# Patient Record
Sex: FEMALE | Race: BLACK OR AFRICAN AMERICAN | NOT HISPANIC OR LATINO | Employment: FULL TIME | URBAN - METROPOLITAN AREA
[De-identification: names, ages, dates, MRNs, and addresses within clinical notes are randomized per-mention and may not be internally consistent; named-entity substitution may affect disease eponyms.]

---

## 2021-05-03 ENCOUNTER — EH NON-PROVIDER (OUTPATIENT)
Dept: OCCUPATIONAL MEDICINE | Facility: CLINIC | Age: 41
End: 2021-05-03

## 2021-05-03 ENCOUNTER — EMPLOYEE HEALTH (OUTPATIENT)
Dept: OCCUPATIONAL MEDICINE | Facility: CLINIC | Age: 41
End: 2021-05-03

## 2021-05-03 ENCOUNTER — HOSPITAL ENCOUNTER (OUTPATIENT)
Facility: MEDICAL CENTER | Age: 41
End: 2021-05-03
Attending: NURSE PRACTITIONER
Payer: COMMERCIAL

## 2021-05-03 VITALS
DIASTOLIC BLOOD PRESSURE: 84 MMHG | TEMPERATURE: 98.2 F | WEIGHT: 186 LBS | OXYGEN SATURATION: 97 % | RESPIRATION RATE: 16 BRPM | HEART RATE: 57 BPM | HEIGHT: 64 IN | BODY MASS INDEX: 31.76 KG/M2 | SYSTOLIC BLOOD PRESSURE: 118 MMHG

## 2021-05-03 DIAGNOSIS — Z02.1 PRE-EMPLOYMENT DRUG SCREENING: Primary | ICD-10-CM

## 2021-05-03 DIAGNOSIS — Z02.1 PRE-EMPLOYMENT HEALTH SCREENING EXAMINATION: ICD-10-CM

## 2021-05-03 DIAGNOSIS — Z02.1 PRE-EMPLOYMENT DRUG SCREENING: ICD-10-CM

## 2021-05-03 LAB
AMP AMPHETAMINE: NORMAL
BAR BARBITURATES: NORMAL
BZO BENZODIAZEPINES: NORMAL
COC COCAINE: NORMAL
INT CON NEG: NORMAL
INT CON POS: NORMAL
MDMA ECSTASY: NORMAL
MET METHAMPHETAMINES: NORMAL
MEV IGG SER IA-ACNC: 1.02
MTD METHADONE: NORMAL
MUV IGG SER IA-ACNC: 0.92
OPI OPIATES: NORMAL
OXY OXYCODONE: NORMAL
PCP PHENCYCLIDINE: NORMAL
POC URINE DRUG SCREEN OCDRS: NEGATIVE
RUBV AB SER QL: 52.2 IU/ML
THC: NORMAL
VZV IGG SER IA-ACNC: 2.02

## 2021-05-03 PROCEDURE — 86787 VARICELLA-ZOSTER ANTIBODY: CPT | Performed by: NURSE PRACTITIONER

## 2021-05-03 PROCEDURE — 86765 RUBEOLA ANTIBODY: CPT | Performed by: NURSE PRACTITIONER

## 2021-05-03 PROCEDURE — 86480 TB TEST CELL IMMUN MEASURE: CPT | Performed by: NURSE PRACTITIONER

## 2021-05-03 PROCEDURE — 86762 RUBELLA ANTIBODY: CPT | Performed by: NURSE PRACTITIONER

## 2021-05-03 PROCEDURE — 90715 TDAP VACCINE 7 YRS/> IM: CPT | Performed by: NURSE PRACTITIONER

## 2021-05-03 PROCEDURE — 80305 DRUG TEST PRSMV DIR OPT OBS: CPT | Performed by: NURSE PRACTITIONER

## 2021-05-03 PROCEDURE — 8915 PR COMPREHENSIVE PHYSICAL: Performed by: NURSE PRACTITIONER

## 2021-05-03 PROCEDURE — 86735 MUMPS ANTIBODY: CPT | Performed by: NURSE PRACTITIONER

## 2021-05-03 RX ORDER — LEVETIRACETAM 250 MG/1
1000 TABLET ORAL 2 TIMES DAILY
COMMUNITY
End: 2021-07-01

## 2021-05-03 RX ORDER — ZOLPIDEM TARTRATE 5 MG/1
5 TABLET ORAL NIGHTLY PRN
COMMUNITY
End: 2021-07-01

## 2021-05-04 LAB
GAMMA INTERFERON BACKGROUND BLD IA-ACNC: 0.08 IU/ML
M TB IFN-G BLD-IMP: NEGATIVE
M TB IFN-G CD4+ BCKGRND COR BLD-ACNC: 0 IU/ML
MITOGEN IGNF BCKGRD COR BLD-ACNC: >10 IU/ML
QFT TB2 - NIL TBQ2: 0 IU/ML

## 2021-05-10 ENCOUNTER — EH NON-PROVIDER (OUTPATIENT)
Dept: OCCUPATIONAL MEDICINE | Facility: CLINIC | Age: 41
End: 2021-05-10

## 2021-05-10 DIAGNOSIS — Z71.85 IMMUNIZATION COUNSELING: ICD-10-CM

## 2021-06-22 ENCOUNTER — TELEPHONE (OUTPATIENT)
Dept: HEALTH INFORMATION MANAGEMENT | Facility: OTHER | Age: 41
End: 2021-06-22

## 2021-07-01 ENCOUNTER — OFFICE VISIT (OUTPATIENT)
Dept: MEDICAL GROUP | Facility: PHYSICIAN GROUP | Age: 41
End: 2021-07-01
Payer: COMMERCIAL

## 2021-07-01 VITALS
BODY MASS INDEX: 31.41 KG/M2 | DIASTOLIC BLOOD PRESSURE: 78 MMHG | HEIGHT: 64 IN | SYSTOLIC BLOOD PRESSURE: 126 MMHG | TEMPERATURE: 97.8 F | RESPIRATION RATE: 16 BRPM | OXYGEN SATURATION: 100 % | HEART RATE: 58 BPM | WEIGHT: 184 LBS

## 2021-07-01 DIAGNOSIS — Z13.0 SCREENING FOR DEFICIENCY ANEMIA: ICD-10-CM

## 2021-07-01 DIAGNOSIS — R73.01 ELEVATED FASTING GLUCOSE: ICD-10-CM

## 2021-07-01 DIAGNOSIS — F51.01 PRIMARY INSOMNIA: ICD-10-CM

## 2021-07-01 DIAGNOSIS — G40.109 PARTIAL EPILEPSY (HCC): ICD-10-CM

## 2021-07-01 DIAGNOSIS — Z13.29 THYROID DISORDER SCREEN: ICD-10-CM

## 2021-07-01 DIAGNOSIS — Z13.6 SCREENING FOR CARDIOVASCULAR CONDITION: ICD-10-CM

## 2021-07-01 DIAGNOSIS — Z12.31 ENCOUNTER FOR SCREENING MAMMOGRAM FOR MALIGNANT NEOPLASM OF BREAST: ICD-10-CM

## 2021-07-01 DIAGNOSIS — R10.2 PELVIC PAIN: ICD-10-CM

## 2021-07-01 PROBLEM — D17.9 LIPOMA: Status: RESOLVED | Noted: 2019-11-27 | Resolved: 2021-07-01

## 2021-07-01 PROBLEM — M54.50 LOW BACK PAIN: Status: ACTIVE | Noted: 2017-02-13

## 2021-07-01 PROBLEM — D17.9 LIPOMA: Status: ACTIVE | Noted: 2019-11-27

## 2021-07-01 PROBLEM — M54.50 LOW BACK PAIN: Status: RESOLVED | Noted: 2017-02-13 | Resolved: 2021-07-01

## 2021-07-01 PROBLEM — U07.1 DISEASE DUE TO SEVERE ACUTE RESPIRATORY SYNDROME CORONAVIRUS 2 (SARS-COV-2): Status: RESOLVED | Noted: 2020-11-29 | Resolved: 2021-07-01

## 2021-07-01 PROBLEM — U07.1 DISEASE DUE TO SEVERE ACUTE RESPIRATORY SYNDROME CORONAVIRUS 2 (SARS-COV-2): Status: ACTIVE | Noted: 2020-11-29

## 2021-07-01 PROCEDURE — 99204 OFFICE O/P NEW MOD 45 MIN: CPT | Performed by: INTERNAL MEDICINE

## 2021-07-01 RX ORDER — ALPRAZOLAM 0.25 MG/1
0.25 TABLET ORAL
COMMUNITY
End: 2021-07-01

## 2021-07-01 RX ORDER — TEMAZEPAM 30 MG/1
30 CAPSULE ORAL
COMMUNITY
End: 2021-07-01

## 2021-07-01 RX ORDER — IBUPROFEN 600 MG/1
600 TABLET ORAL
COMMUNITY
Start: 2020-12-31 | End: 2021-08-17

## 2021-07-01 RX ORDER — ZOLPIDEM TARTRATE 5 MG/1
TABLET ORAL
COMMUNITY
Start: 2021-04-27 | End: 2021-07-01

## 2021-07-01 RX ORDER — ZOLPIDEM TARTRATE 5 MG/1
5 TABLET ORAL NIGHTLY PRN
Qty: 30 TABLET | Refills: 2 | Status: SHIPPED | OUTPATIENT
Start: 2021-07-01 | End: 2021-07-31

## 2021-07-01 RX ORDER — LEVETIRACETAM 250 MG/1
TABLET ORAL
Qty: 90 TABLET | Refills: 2 | Status: SHIPPED | OUTPATIENT
Start: 2021-07-01 | End: 2021-09-29 | Stop reason: SDUPTHER

## 2021-07-01 ASSESSMENT — PATIENT HEALTH QUESTIONNAIRE - PHQ9: CLINICAL INTERPRETATION OF PHQ2 SCORE: 0

## 2021-08-17 ENCOUNTER — TELEMEDICINE (OUTPATIENT)
Dept: MEDICAL GROUP | Facility: PHYSICIAN GROUP | Age: 41
End: 2021-08-17
Payer: COMMERCIAL

## 2021-08-17 VITALS — RESPIRATION RATE: 16 BRPM | BODY MASS INDEX: 30.73 KG/M2 | WEIGHT: 180 LBS | HEIGHT: 64 IN

## 2021-08-17 DIAGNOSIS — S81.811A LACERATION OF RIGHT LOWER EXTREMITY, INITIAL ENCOUNTER: ICD-10-CM

## 2021-08-17 PROCEDURE — 99212 OFFICE O/P EST SF 10 MIN: CPT | Mod: 95 | Performed by: INTERNAL MEDICINE

## 2021-08-17 RX ORDER — ZOLPIDEM TARTRATE 5 MG/1
5 TABLET ORAL NIGHTLY PRN
COMMUNITY
End: 2021-09-24 | Stop reason: SDUPTHER

## 2021-08-17 RX ORDER — IBUPROFEN 600 MG/1
1 TABLET ORAL EVERY 6 HOURS PRN
COMMUNITY
Start: 2020-12-31 | End: 2021-09-24

## 2021-08-17 NOTE — PROGRESS NOTES
Virtual Visit: Established Patient   This visit was conducted via Zoom using secure and encrypted videoconferencing technology. The patient was in a private location in the state of [place].    The patient's identity was confirmed and verbal consent was obtained for this virtual visit.    Subjective:   CC: Lacson Harris is a 40 y.o. female presenting for evaluation and management of:    The patient states she was at [employer] 2 days ago and cut herself on a loose screw that was on the back of a couch patient.  The patient reports a cut on the right lateral thigh.  She cleaned the wound and has been applying Neosporin.  She states that it has been many years since she has had a tetanus vaccine.    ROS   Denies any recent fevers or chills. No nausea or vomiting. No chest pains or shortness of breath.     Allergies   Allergen Reactions   • [place] Oil        Current medicines (including changes today)  Current Outpatient Medications   Medication Sig Dispense Refill   • zolpidem (AMBIEN) 5 MG Tab Take 5 mg by mouth at bedtime as needed for Sleep.     • ibuprofen (MOTRIN) 600 MG Tab Take 1 Tablet by mouth every 6 hours as needed.     • levETIRAcetam (KEPPRA) 250 MG tablet Take 3 tablets daily x 1 month, then 2 tablets daily x 1 month, then take 1 tablet daily x 1 month, then off. 90 tablet 2     No current facility-administered medications for this visit.       Patient Active Problem List    Diagnosis Date Noted   • Primary insomnia 07/01/2021   • Elevated fasting glucose 02/02/2016   • Partial epilepsy (HCC) 12/03/2015       Family History   Problem Relation Age of Onset   • No Known Problems Mother    • No Known Problems Father    • Cancer Paternal Grandmother        She  has a past medical history of Anemia (2/5/2009), Disease due to severe acute respiratory syndrome coronavirus 2 (SARS-CoV-2) (11/29/2020), total hysterectomy (1/27/2016), Lipoma (11/27/2019), and Low back pain (2/13/2017).  She  has a  "past surgical history that includes abdominal hysterectomy total (2009) and lumpectomy (2001).       Objective:   Resp 16   Ht 1.626 m (5' 4\")   Wt 81.6 kg (180 lb)   BMI 30.90 kg/m²     Physical Exam:  Constitutional: Alert, no distress, well-groomed.  Skin: No rashes in visible areas.  Eye: Round. Conjunctiva clear, lids normal. No icterus.   ENMT: Lips pink without lesions, good dentition, moist mucous membranes. Phonation normal.  Neck: No masses, no thyromegaly. Moves freely without pain.  Respiratory: Unlabored respiratory effort, no cough or audible wheeze  Psych: Alert and oriented x3, normal affect and mood.       Assessment and Plan:   The following treatment plan was discussed:     Laceration of right lower extremity, initial encounter  This is an acute condition.  Patient sustained a laceration from a loose screw that was on the back of a couch at [employer].  No evidence of infection, exam limited as it was through video.  -Advised patient to schedule a MA visit for tetanus vaccine  -Recommended patient continue Neosporin as needed  -Given precautions to contact us for worsening erythema or drainage    My total time spent caring for the patient on the day of the encounter was 10 minutes.   This does not include time spent on separately billable procedures/tests.      Follow-up: As previously scheduled in October    Please note that this dictation was created using voice recognition software. I have made every reasonable attempt to correct obvious errors, but I expect that there are errors of grammar and possibly content that I did not discover before finalizing the note.             "

## 2021-08-25 NOTE — PROGRESS NOTES
Virtual Visit: Established Patient   This visit was conducted via Zoom using secure and encrypted videoconferencing technology. The patient was in a private location in the state of [place].    The patient's identity was confirmed and verbal consent was obtained for this virtual visit.    Subjective:   CC: Shoulder pain    Rosa Harris is a 40 y.o. female presenting for evaluation and management of:    The patient reports a 5-day history of left shoulder pain.  She injured it when she was weightlifting.  She has been taking 400 mg of ibuprofen about once a day.  She states it is difficult to raise her arm above her head.    With regard to her seizure disorder, she is currently taking 750 mg (three 250 mg capsules) daily.    ROS   Denies any recent fevers or chills. No nausea or vomiting. No chest pains or shortness of breath.     Allergies   Allergen Reactions   • [place] Oil        Current medicines (including changes today)  Current Outpatient Medications   Medication Sig Dispense Refill   • ibuprofen (MOTRIN) 600 MG Tab Take 1 Tablet by mouth every 6 hours as needed. 45 Tablet 0   • zolpidem (AMBIEN) 5 MG Tab Take 5 mg by mouth at bedtime as needed for Sleep.     • ibuprofen (MOTRIN) 600 MG Tab Take 1 Tablet by mouth every 6 hours as needed.     • levETIRAcetam (KEPPRA) 250 MG tablet Take 3 tablets daily x 1 month, then 2 tablets daily x 1 month, then take 1 tablet daily x 1 month, then off. 90 tablet 2     No current facility-administered medications for this visit.       Patient Active Problem List    Diagnosis Date Noted   • Laceration of right lower extremity 08/17/2021   • Primary insomnia 07/01/2021   • Elevated fasting glucose 02/02/2016   • Partial epilepsy (HCC) 12/03/2015       Family History   Problem Relation Age of Onset   • No Known Problems Mother    • No Known Problems Father    • Cancer Paternal Grandmother        She  has a past medical history of Anemia (2/5/2009), Disease due to severe  "acute respiratory syndrome coronavirus 2 (SARS-CoV-2) (11/29/2020), total hysterectomy (1/27/2016), Lipoma (11/27/2019), and Low back pain (2/13/2017).  She  has a past surgical history that includes abdominal hysterectomy total (2009) and lumpectomy (2001).       Objective:   Resp 16   Ht 1.626 m (5' 4\")   Wt 81.6 kg (180 lb)   BMI 30.90 kg/m²     Physical Exam:  Constitutional: Alert, no distress, well-groomed.  Skin: No rashes in visible areas.  Eye: Round. Conjunctiva clear, lids normal. No icterus.   ENMT: Lips pink without lesions, good dentition, moist mucous membranes. Phonation normal.  Neck: No masses, no thyromegaly. Moves freely without pain.  Respiratory: Unlabored respiratory effort, no cough or audible wheeze  Psych: Alert and oriented x3, normal affect and mood.       Assessment and Plan:   The following treatment plan was discussed:     Acute pain of left shoulder  This is an acute condition.  Patient reports a 5-day history of left shoulder pain.  She states she injured it while weightlifting.  She reports it is difficult to raise her arm above her head.  -Advised ibuprofen 600 mg 3 times daily, ice, and stretching exercises  -Patient was given handout of shoulder stretching exercises, as well as a PT referral  -She is not interested in a Medrol Dosepak at this time  -If her pain does not improve we will consider referral to sports medicine for potential injection versus Medrol Dosepak  - DX-SHOULDER 2+ LEFT; Future  - ibuprofen (MOTRIN) 600 MG Tab; Take 1 Tablet by mouth every 6 hours as needed.  Dispense: 45 Tablet; Refill: 0  - REFERRAL TO PHYSICAL THERAPY      Follow-up: Return in about 6 months (around 2/27/2022) for f/u labs.     Please note that this dictation was created using voice recognition software. I have made every reasonable attempt to correct obvious errors, but I expect that there are errors of grammar and possibly content that I did not discover before finalizing the " note.

## 2021-08-27 ENCOUNTER — TELEMEDICINE (OUTPATIENT)
Dept: MEDICAL GROUP | Facility: PHYSICIAN GROUP | Age: 41
End: 2021-08-27
Payer: COMMERCIAL

## 2021-08-27 VITALS — WEIGHT: 180 LBS | RESPIRATION RATE: 16 BRPM | HEIGHT: 64 IN | BODY MASS INDEX: 30.73 KG/M2

## 2021-08-27 DIAGNOSIS — M25.512 ACUTE PAIN OF LEFT SHOULDER: ICD-10-CM

## 2021-08-27 PROCEDURE — 99213 OFFICE O/P EST LOW 20 MIN: CPT | Mod: 95 | Performed by: INTERNAL MEDICINE

## 2021-08-27 RX ORDER — IBUPROFEN 600 MG/1
600 TABLET ORAL EVERY 6 HOURS PRN
Qty: 45 TABLET | Refills: 0 | Status: SHIPPED | OUTPATIENT
Start: 2021-08-27 | End: 2021-09-24 | Stop reason: SDUPTHER

## 2021-08-30 ENCOUNTER — HOSPITAL ENCOUNTER (OUTPATIENT)
Dept: RADIOLOGY | Facility: MEDICAL CENTER | Age: 41
End: 2021-08-30
Attending: INTERNAL MEDICINE
Payer: COMMERCIAL

## 2021-08-30 DIAGNOSIS — R10.2 PELVIC PAIN: ICD-10-CM

## 2021-08-30 DIAGNOSIS — Z12.31 ENCOUNTER FOR SCREENING MAMMOGRAM FOR MALIGNANT NEOPLASM OF BREAST: ICD-10-CM

## 2021-08-30 PROCEDURE — 76830 TRANSVAGINAL US NON-OB: CPT

## 2021-08-30 PROCEDURE — 77063 BREAST TOMOSYNTHESIS BI: CPT

## 2021-09-08 DIAGNOSIS — N63.0 BREAST MASS: ICD-10-CM

## 2021-09-16 ENCOUNTER — HOSPITAL ENCOUNTER (OUTPATIENT)
Dept: RADIOLOGY | Facility: MEDICAL CENTER | Age: 41
End: 2021-09-16
Payer: COMMERCIAL

## 2021-09-23 ENCOUNTER — HOSPITAL ENCOUNTER (OUTPATIENT)
Dept: RADIOLOGY | Facility: MEDICAL CENTER | Age: 41
End: 2021-09-23
Attending: INTERNAL MEDICINE
Payer: COMMERCIAL

## 2021-09-23 DIAGNOSIS — R92.8 ABNORMAL MAMMOGRAM: ICD-10-CM

## 2021-09-23 DIAGNOSIS — N63.0 BREAST MASS: ICD-10-CM

## 2021-09-23 DIAGNOSIS — R92.8 ABNORMAL FINDING ON BREAST IMAGING: ICD-10-CM

## 2021-09-23 PROCEDURE — 76642 ULTRASOUND BREAST LIMITED: CPT | Mod: LT

## 2021-09-23 PROCEDURE — G0279 TOMOSYNTHESIS, MAMMO: HCPCS

## 2021-09-24 ENCOUNTER — TELEMEDICINE (OUTPATIENT)
Dept: MEDICAL GROUP | Facility: PHYSICIAN GROUP | Age: 41
End: 2021-09-24
Payer: COMMERCIAL

## 2021-09-24 VITALS — WEIGHT: 180 LBS | HEIGHT: 64 IN | RESPIRATION RATE: 14 BRPM | BODY MASS INDEX: 30.73 KG/M2

## 2021-09-24 DIAGNOSIS — Z01.419 ENCOUNTER FOR GYNECOLOGICAL EXAMINATION WITHOUT ABNORMAL FINDING: ICD-10-CM

## 2021-09-24 DIAGNOSIS — M25.512 ACUTE PAIN OF LEFT SHOULDER: ICD-10-CM

## 2021-09-24 DIAGNOSIS — G40.109 PARTIAL EPILEPSY (HCC): ICD-10-CM

## 2021-09-24 DIAGNOSIS — R73.01 ELEVATED FASTING GLUCOSE: ICD-10-CM

## 2021-09-24 DIAGNOSIS — F51.01 PRIMARY INSOMNIA: ICD-10-CM

## 2021-09-24 PROCEDURE — 99214 OFFICE O/P EST MOD 30 MIN: CPT | Mod: 95 | Performed by: INTERNAL MEDICINE

## 2021-09-24 RX ORDER — ZOLPIDEM TARTRATE 5 MG/1
5 TABLET ORAL NIGHTLY PRN
Qty: 30 TABLET | Refills: 2 | Status: SHIPPED | OUTPATIENT
Start: 2021-09-30 | End: 2021-10-30

## 2021-09-24 RX ORDER — IBUPROFEN 600 MG/1
600 TABLET ORAL EVERY 6 HOURS PRN
Qty: 45 TABLET | Refills: 2 | Status: SHIPPED | OUTPATIENT
Start: 2021-09-24 | End: 2022-12-02 | Stop reason: SDUPTHER

## 2021-09-24 NOTE — PROGRESS NOTES
Virtual Visit: Established Patient   This visit was conducted via Zoom using secure and encrypted videoconferencing technology. The patient was in a private location in the state of [place].    The patient's identity was confirmed and verbal consent was obtained for this virtual visit.    Subjective:   CC: Refill of Ambien    Rosa Harris is a 40 y.o. female presenting for evaluation and management of:    The patient states she feels well. She has no acute complaints. She has declined the COVID-19 vaccine as well as the influenza vaccine for this year.     Her shoulder pain is improving.  She states the ibuprofen 600 mg helps.  She was not able to get into physical therapy until November.  She is doing shoulder exercises on her own.    She is on the Keppra 750 mg (three 250 mg pills) daily.  When she tried to drop down to two pills daily, she felt a little strange with some possible anxiety.    ROS   Denies any recent fevers or chills. No nausea or vomiting. No chest pains or shortness of breath.     Allergies   Allergen Reactions   • [place] Oil        Current medicines (including changes today)  Current Outpatient Medications   Medication Sig Dispense Refill   • [START ON 9/30/2021] zolpidem (AMBIEN) 5 MG Tab Take 1 Tablet by mouth at bedtime as needed for Sleep for up to 30 days. 30 Tablet 2   • ibuprofen (MOTRIN) 600 MG Tab Take 1 Tablet by mouth every 6 hours as needed. 45 Tablet 2   • levETIRAcetam (KEPPRA) 250 MG tablet Take 3 tablets daily x 1 month, then 2 tablets daily x 1 month, then take 1 tablet daily x 1 month, then off. 90 tablet 2     No current facility-administered medications for this visit.       Patient Active Problem List    Diagnosis Date Noted   • Acute pain of left shoulder 09/24/2021   • Laceration of right lower extremity 08/17/2021   • Primary insomnia 07/01/2021   • Elevated fasting glucose 02/02/2016   • Partial epilepsy (HCC) 12/03/2015       Family History   Problem Relation Age  "of Onset   • No Known Problems Mother    • No Known Problems Father    • Cancer Paternal Grandmother        She  has a past medical history of Anemia (2/5/2009), Disease due to severe acute respiratory syndrome coronavirus 2 (SARS-CoV-2) (11/29/2020), total hysterectomy (1/27/2016), Lipoma (11/27/2019), and Low back pain (2/13/2017).  She  has a past surgical history that includes abdominal hysterectomy total (2009) and lumpectomy (2001).       Objective:   Resp 14   Ht 1.626 m (5' 4\")   Wt 81.6 kg (180 lb)   BMI 30.90 kg/m²     Physical Exam:  Constitutional: Alert, no distress, well-groomed.  Skin: No rashes in visible areas.  Eye: Round. Conjunctiva clear, lids normal. No icterus.   ENMT: Lips pink without lesions, good dentition, moist mucous membranes. Phonation normal.  Neck: No masses, no thyromegaly. Moves freely without pain.  Respiratory: Unlabored respiratory effort, no cough or audible wheeze  Psych: Alert and oriented x3, normal affect and mood.       Assessment and Plan:   The following treatment plan was discussed:     Partial epilepsy (HCC)  This is a chronic condition.    Stable.  The patient reports a single seizure in 2011.    She has been on Keppra 1000 mg twice daily since 2011.  She would like to titrate off of the medication, at the time of our initial she had self titrated down to 1000 mg daily.  Her dose was further reduced to 750 mg daily, with plan to reduce by 250 mg each month, until off the medication.  The patient states she is currently taking Keppra 750 mg (three 250 mg capsules) daily.  She would like to stay at this dose for a while, as when she tried to decrease it further she felt a little strange with a bit of anxiety.  -Continue Keppra 750 mg (three 250 mg capsules) daily     Primary insomnia  This is a chronic condition.  Well-controlled.  The patient has been on Ambien 5 to 10 mg for many years.  Review of the  shows that the patient was last given a prescription for " zolpidem 5 mg, dispo #30, on 8/30/2021. Obtained and reviewed patient utilization report from [facility] pharmacy database on 9/24/2021 4:47 AM  prior to writing prescription for controlled substance II, III or IV per [place] bill . Based on assessment of the report, the prescription is medically necessary.   - Controlled Substance Treatment Agreement signed and scanned into the patient's chart on 7/1/2021  - zolpidem (AMBIEN) 5 MG Tab; Take 1 tablet by mouth at bedtime as needed for Sleep for up to 30 days.  Dispense: 30 tablet; Refill: 2    Acute pain of left shoulder  This is an acute condition.  Improving.  Patient was seen on 8/27/2021 for a 5-day history of acute left shoulder pain, injured while weightlifting.  She was started on ibuprofen 600 mg 3 times daily, ice, and stretching exercises.  Referral to PT was placed, she is not able to be evaluated by them until November.  She is currently taking ibuprofen 600 mg as needed, which she states is very helpful.  She is also doing shoulder exercises on her own.  -Continue ibuprofen 600 mg as needed       Follow-up: Return in about 3 months (around 12/24/2021) for Controlled Substance.     Please note that this dictation was created using voice recognition software. I have made every reasonable attempt to correct obvious errors, but I expect that there are errors of grammar and possibly content that I did not discover before finalizing the note.

## 2021-09-29 DIAGNOSIS — G40.109 PARTIAL EPILEPSY (HCC): ICD-10-CM

## 2021-09-29 RX ORDER — LEVETIRACETAM 250 MG/1
TABLET ORAL
Qty: 270 TABLET | Refills: 3 | Status: SHIPPED | OUTPATIENT
Start: 2021-09-29 | End: 2022-09-27 | Stop reason: SDUPTHER

## 2021-09-29 NOTE — TELEPHONE ENCOUNTER
----- Message from Rosa Harris sent at 9/29/2021 11:51 AM PDT -----  Regarding: RX - Levetiracetam 250mg  Well good afternoon Dr. Cuevas! I hope your Wednesday is treating you well :)    I am reaching to check on the status of my Keppra refill. When I went to [facility] to pick my meds. up that you and I had discussed, I was told the Keppra was not requested for a refill. When you have a ‘free’ (yea right) second can you possibly push that med back through? Purty please.    Thank you!    Rosa

## 2021-10-15 ENCOUNTER — OFFICE VISIT (OUTPATIENT)
Dept: URGENT CARE | Facility: PHYSICIAN GROUP | Age: 41
End: 2021-10-15
Payer: COMMERCIAL

## 2021-10-15 ENCOUNTER — HOSPITAL ENCOUNTER (OUTPATIENT)
Dept: RADIOLOGY | Facility: MEDICAL CENTER | Age: 41
End: 2021-10-15
Attending: PHYSICIAN ASSISTANT
Payer: COMMERCIAL

## 2021-10-15 VITALS
HEART RATE: 62 BPM | DIASTOLIC BLOOD PRESSURE: 90 MMHG | BODY MASS INDEX: 30.73 KG/M2 | WEIGHT: 180 LBS | TEMPERATURE: 97.7 F | HEIGHT: 64 IN | RESPIRATION RATE: 14 BRPM | OXYGEN SATURATION: 98 % | SYSTOLIC BLOOD PRESSURE: 138 MMHG

## 2021-10-15 DIAGNOSIS — S83.412A SPRAIN OF MEDIAL COLLATERAL LIGAMENT OF LEFT KNEE, INITIAL ENCOUNTER: ICD-10-CM

## 2021-10-15 DIAGNOSIS — S89.92XA INJURY OF LEFT KNEE, INITIAL ENCOUNTER: ICD-10-CM

## 2021-10-15 PROCEDURE — 73564 X-RAY EXAM KNEE 4 OR MORE: CPT | Mod: LT

## 2021-10-15 PROCEDURE — 99214 OFFICE O/P EST MOD 30 MIN: CPT | Performed by: PHYSICIAN ASSISTANT

## 2021-10-15 RX ORDER — HYDROCODONE BITARTRATE AND ACETAMINOPHEN 5; 325 MG/1; MG/1
1 TABLET ORAL EVERY 6 HOURS PRN
Qty: 8 TABLET | Refills: 0 | Status: SHIPPED | OUTPATIENT
Start: 2021-10-15 | End: 2021-10-20

## 2021-10-15 ASSESSMENT — ENCOUNTER SYMPTOMS
SORE THROAT: 0
BLURRED VISION: 0
NAUSEA: 0
FEVER: 0
SENSORY CHANGE: 0
SHORTNESS OF BREATH: 0
PALPITATIONS: 0
TINGLING: 0
CHILLS: 0
VOMITING: 0

## 2021-10-15 NOTE — PROGRESS NOTES
Subjective     Rosa Harris is a 41 y.o. female who presents with Knee Injury (Fell on L knee 10/14)    HPI:  Rosa Harris is a 41 y.o. female who presents today for evaluation of left knee pain. Patient states that she stumbled yesterday while exercising.  She landed with her feet firmly on the ground but notes that her left knee twisted when she impacted.  She had very minimal discomfort at the time but has not progressed the pain had worsened and she was having trouble sleeping last night secondary to the pain.  She tried icing and taking ibuprofen without much relief.  Denies any previous injury to the knee.  Most of her pain is in the medial aspect and is worse with ambulation.      Review of Systems   Constitutional: Negative for chills and fever.   HENT: Negative for sore throat.    Eyes: Negative for blurred vision.   Respiratory: Negative for shortness of breath.    Cardiovascular: Negative for chest pain and palpitations.   Gastrointestinal: Negative for nausea and vomiting.   Musculoskeletal: Positive for joint pain (left knee).   Neurological: Negative for tingling and sensory change.         PMH:  has a past medical history of Anemia (2/5/2009), Disease due to severe acute respiratory syndrome coronavirus 2 (SARS-CoV-2) (11/29/2020), total hysterectomy (1/27/2016), Lipoma (11/27/2019), and Low back pain (2/13/2017).  MEDS:   Current Outpatient Medications:   •  levETIRAcetam (KEPPRA) 250 MG tablet, Take 3 tablets daily., Disp: 270 Tablet, Rfl: 3  •  zolpidem (AMBIEN) 5 MG Tab, Take 1 Tablet by mouth at bedtime as needed for Sleep for up to 30 days., Disp: 30 Tablet, Rfl: 2  •  ibuprofen (MOTRIN) 600 MG Tab, Take 1 Tablet by mouth every 6 hours as needed., Disp: 45 Tablet, Rfl: 2  ALLERGIES:   Allergies   Allergen Reactions   • [place] Oil      SURGHX:   Past Surgical History:   Procedure Laterality Date   • ABDOMINAL HYSTERECTOMY TOTAL  2009   • LUMPECTOMY  2001     SOCHX:  reports that she  "has never smoked. She has never used smokeless tobacco. She reports current alcohol use. She reports that she does not use drugs.  FH: Family history was reviewed, no pertinent findings to report      Objective     /90 (BP Location: Right arm, Patient Position: Sitting, BP Cuff Size: Adult)   Pulse 62   Temp 36.5 °C (97.7 °F) (Temporal)   Resp 14   Ht 1.626 m (5' 4\")   Wt 81.6 kg (180 lb)   SpO2 98%   BMI 30.90 kg/m²      Physical Exam  Constitutional:       Appearance: She is well-developed.   HENT:      Head: Normocephalic and atraumatic.      Right Ear: External ear normal.      Left Ear: External ear normal.   Eyes:      Conjunctiva/sclera: Conjunctivae normal.      Pupils: Pupils are equal, round, and reactive to light.   Cardiovascular:      Rate and Rhythm: Normal rate and regular rhythm.      Heart sounds: Normal heart sounds. No murmur heard.     Pulmonary:      Effort: Pulmonary effort is normal.      Breath sounds: Normal breath sounds. No wheezing.   Musculoskeletal:      Left knee: Swelling (mild swelling on medial aspect) present. No erythema, ecchymosis, bony tenderness or crepitus. Decreased range of motion (unable to fully extend the knee secondary to pain). Tenderness present over the MCL. No medial joint line, lateral joint line or patellar tendon tenderness. MCL laxity present. Normal meniscus and normal patellar mobility.      Instability Tests: Anterior drawer test negative. Posterior drawer test negative. Medial Deirdre test negative and lateral Deirdre test negative.      Comments: Antalgic gait   Skin:     General: Skin is warm and dry.      Capillary Refill: Capillary refill takes less than 2 seconds.   Neurological:      Mental Status: She is alert and oriented to person, place, and time.   Psychiatric:         Behavior: Behavior normal.         Judgment: Judgment normal.         DX-KNEE COMPLETE 4+ LEFT  IMPRESSION:  1.  Negative for left knee fracture or " malalignment     2.  Medial femorotibial and patellofemoral compartment osteoarthritis      *X-rays were reviewed and interpreted independently by me. I agree with the radiologist's findings     Assessment & Plan     1. Injury of left knee, initial encounter  - DX-KNEE COMPLETE 4+ LEFT; Future  - HYDROcodone-acetaminophen (NORCO) 5-325 MG Tab per tablet; Take 1 Tablet by mouth every 6 hours as needed (moderate to severe pain) for up to 5 days.  Dispense: 8 Tablet; Refill: 0  - REFERRAL TO ORTHOPEDICS    2. Sprain of medial collateral ligament of left knee, initial encounter  - HYDROcodone-acetaminophen (NORCO) 5-325 MG Tab per tablet; Take 1 Tablet by mouth every 6 hours as needed (moderate to severe pain) for up to 5 days.  Dispense: 8 Tablet; Refill: 0  - REFERRAL TO ORTHOPEDICS      PDMP was reviewed prior to prescribing the Norco tablets.  No increased risk of abuse was noted.  Patient was advised to try using OTC analgesics first and if that is not adequate then she can switch over to [place] as needed.  She was only prescribed 8 tablets.  She was cautioned about risk of dependence, respiratory depression and sedating effects of the medication.  She was advised not to use this medication while driving, operating heavy machinery, or while drinking alcohol.     -Knee brace was applied and crutches were provided.  She was advised to be nonweightbearing for the next 2 to 3 days and then she may be weightbearing as tolerated with the use of the crutches.  - Apply ice multiple times per day  - Keep elevated as much as possible  Discussed with patient that her symptoms are consistent with possible MCL sprain/tear.  She will need to follow-up with orthopedics more definitive management evaluation and probable MRI.            Differential Diagnosis, natural history, and supportive care discussed. Return to the Urgent Care or follow up with your PCP if symptoms fail to resolve, or for any new or worsening symptoms.  Emergency room precautions discussed. Patient and/or family appears understanding of information.

## 2021-10-21 ENCOUNTER — TELEPHONE (OUTPATIENT)
Dept: URGENT CARE | Facility: PHYSICIAN GROUP | Age: 41
End: 2021-10-21

## 2021-10-21 NOTE — TELEPHONE ENCOUNTER
Labs entered.    Patient came in and was wondering if she can get the MRI ordered that was spoken about during her visit.

## 2021-11-02 ENCOUNTER — PHYSICAL THERAPY (OUTPATIENT)
Dept: PHYSICAL THERAPY | Facility: REHABILITATION | Age: 41
End: 2021-11-02
Attending: INTERNAL MEDICINE
Payer: COMMERCIAL

## 2021-11-02 DIAGNOSIS — M25.512 ACUTE PAIN OF LEFT SHOULDER: ICD-10-CM

## 2021-11-02 PROCEDURE — 97110 THERAPEUTIC EXERCISES: CPT

## 2021-11-02 PROCEDURE — 97161 PT EVAL LOW COMPLEX 20 MIN: CPT

## 2021-11-02 ASSESSMENT — ENCOUNTER SYMPTOMS
QUALITY: SHARP
PAIN SCALE: 3
PAIN SCALE AT LOWEST: 0
PAIN SCALE AT HIGHEST: 8
QUALITY: DULL ACHE

## 2021-11-02 NOTE — OP THERAPY EVALUATION
Outpatient Physical Therapy  INITIAL EVALUATION    [facility] [place]  [address], [address]  [place] [address]  Phone:  [phone]  Fax:  [phone]    Date of Evaluation: 2021    Patient: Rosa Harris  YOB: 1980  MRN: [id number]     Referring Provider: Sabra Cuevas M.D.  [address] [place] [address]   Referring Diagnosis Acute pain of left shoulder [M25.512]     Time Calculation  Start time: 735  Stop time: 815 Time Calculation (min): 40 minutes       Chief Complaint: L shoulder pain    Visit Diagnoses     ICD-10-CM   1. Acute pain of left shoulder  M25.512       Date of onset of impairment: 2021    Subjective:   History of Present Illness:     Mechanism of injury:  Rosa Harris is a 41 y.o. female that presents to therapy with L shoulder pain She states that symptoms came on with insidious onset after working out about 2 years ago. Since then She has been having flare ups of pain about every 4 months that come on with insidious onset. Patient denies fevers/chills, numbness/weakness of upper extremities, dizziness, dysphagia, nystagmus, nausea, diplopia, ataxia, and dysarthria.    Aggravating factors: overhead reaching,lifting objects to the side.    Relieving factors: rest, ice/heat, motrin    ADL limitations: intermittent pain reducing use and function     Pain:     Current pain rating:  3    At best pain ratin    At worst pain ratin    Quality:  Dull ache and sharp      Past Medical History:   Diagnosis Date   • Anemia 2009   • Disease due to severe acute respiratory syndrome coronavirus 2 (SARS-CoV-2) 2020   • Hx of total hysterectomy 2016   • Lipoma 2019    Formatting of this note might be different from the original. Added automatically from request for surgery [id number]   • Low back pain 2017     Past Surgical History:   Procedure Laterality Date   • ABDOMINAL HYSTERECTOMY TOTAL   2009   • LUMPECTOMY  2001     Social History     Tobacco Use   • Smoking status: Never Smoker   • Smokeless tobacco: Never Used   Substance Use Topics   • Alcohol use: Yes     Family and Occupational History     Socioeconomic History   • Marital status:      Spouse name: Not on file   • Number of children: Not on file   • Years of education: Not on file   • Highest education level: Not on file   Occupational History   • Not on file       Objective     Cervical Screen    Cervical range of motion within normal limits  Thoracic Screen    Thoracic range of motion within normal limits    Neurological Testing     Sensation     Shoulder   Left Shoulder   Intact: light touch    Right Shoulder   Intact: light touch    Tenderness     Additional Tenderness Details  No palpable tenderness, hyper or hypotonicity     Active Range of Motion   Left Shoulder   Normal active range of motion    Right Shoulder   Normal active range of motion    Passive Range of Motion   Left Shoulder   Normal passive range of motion    Right Shoulder   Normal passive range of motion    Scapular Mobility   Left Shoulder   Scapular mobility: WFL    Right Shoulder   Scapular mobility: WFL    Strength:      Left Shoulder   Normal muscle strength    Right Shoulder   Normal muscle strength    Tests     Left Shoulder   Positive Neer's and painful arc ( 130 degrees during descending motion of abduction).         Therapeutic Exercises (CPT 27057):       Therapeutic Exercise Summary: HEP instruction/performance and development. Handout provided and exercises located below:  Access Code: [email or web address]  URL: [email or web address]  Date: 11/02/2021  Prepared by: William Thrasher    Exercises        Standing Shoulder External Rotation with Resistance - 2 x daily - 2 sets - 15-20 reps      Scaption with Dumbbells - 2 x daily - 2 sets - 10-15 reps      Patient Education        Shoulder Impingement      Time-based treatments/modalities:    Physical Therapy Timed  Treatment Charges  Therapeutic exercise minutes (CPT 82732): 10 minutes      Assessment, Response and Plan:   Assessment details:  Rosa Harris is a 41 y.o. female with signs and symptoms consistent with chronic on acute subacromial pain syndrome. She requires skilled physical therapy intervention to decrease pain, increase range of motion, increase functional mobility, improve ADL completion and establish a home exercise program.  Goals:   Short Term Goals:   1. Patient will be Independent with prescribed Home Exercise Program (HEP) and will be able to demonstrate exercises without cues for improved overall symptoms/activity tolerance.   2. Pt will improve AROM to be pain free on consistent basis.   Short term goal time span:  1-2 weeks      Long Term Goals:    3. Pt will improve ability to lift > 5# above head without increased pain >1/10.   4. Pt will improve DASH score to less than 25% indicative of improved function and reduced perceived disability.  5. Pt will have improved SPADI score of less than 17% indicative of improved function and reduced disability.  Long term goal time span:  4-6 weeks    Plan:   Planned therapy interventions:  Therapeutic Exercise (CPT 96963), Manual Therapy (CPT 57808), Neuromuscular Re-education (CPT 65912) and E Stim Unattended (CPT 29215)  Frequency: 1-2x/week.  Duration in weeks:  6  Discussed with:  Patient    Functional Assessment Used  PT Functional Assessment Tool Used: SPADI, Quick DASH  PT Functional Assessment Score: SPADI 27%, Quick DASH: 34%     Referring provider co-signature:  I have reviewed this plan of care and my co-signature certifies the need for services.    Certification Period: 11/02/2021 to  12/14/21    Physician Signature: ________________________________ Date: ______________

## 2021-11-04 ENCOUNTER — PHYSICAL THERAPY (OUTPATIENT)
Dept: PHYSICAL THERAPY | Facility: REHABILITATION | Age: 41
End: 2021-11-04
Attending: INTERNAL MEDICINE
Payer: COMMERCIAL

## 2021-11-04 DIAGNOSIS — M25.512 ACUTE PAIN OF LEFT SHOULDER: ICD-10-CM

## 2021-11-04 PROCEDURE — 97110 THERAPEUTIC EXERCISES: CPT

## 2021-11-04 NOTE — OP THERAPY DAILY TREATMENT
"  Outpatient Physical Therapy  DAILY TREATMENT     [facility] [place]  [address], [address]  [place] [address]  Phone:  [phone]  Fax:  [phone]    Date: 11/04/2021    Patient: Rosa Harris  YOB: 1980  MRN: [id number]     Time Calculation    Start time: 0730  Stop time: 0810 Time Calculation (min): 40 minutes         Chief Complaint: Left Shoulder pain  Visit #: 2    SUBJECTIVE:  No pain noted this morning. \"it typically worsens towards the end of the day.\" notes compliance with HEP and has 3# weights at home.     OBJECTIVE:  Current objective measures: AROM WNL, painful arc?          Therapeutic Exercises (CPT 41195):     1. Supine foam roller series, angels, soldiers, TRINH Blue 12 each    2. TRX row, x 20    3. Tband ER , 2x20    4. Scap push ball circles, 2x 10 each ( 3 circles each)    5. Ball wall throw, 1min     6. ER is scaption suppported, 3# x 20      Therapeutic Exercise Summary: HEP instruction/performance and development. Handout provided and exercises located below:  Access Code: [email or web address]  URL: [email or web address]  Date: 11/02/2021  Prepared by: William Thrasher    Exercises        Standing Shoulder External Rotation with Resistance - 2 x daily - 2 sets - 15-20 reps      Scaption with Dumbbells - 2 x daily - 2 sets - 10-15 reps      Patient Education        Shoulder Impingement      Time-based treatments/modalities:    Physical Therapy Timed Treatment Charges  Therapeutic exercise minutes (CPT 44508): 40 minutes      Pain rating (1-10) before treatment:  0  Pain rating (1-10) after treatment:  0    ASSESSMENT:   Response to treatment: symptoms consistent with subacromial pain syndrom/ tendonitis. Exercises /Hep to continue tomorrow with suspected soreness/ pain during as instructed. F/u next week.     PLAN/RECOMMENDATIONS:   Plan for treatment: therapy treatment to continue next visit.  Planned interventions for next visit: continue with current " treatment.

## 2021-11-09 ENCOUNTER — PHYSICAL THERAPY (OUTPATIENT)
Dept: PHYSICAL THERAPY | Facility: REHABILITATION | Age: 41
End: 2021-11-09
Attending: INTERNAL MEDICINE
Payer: COMMERCIAL

## 2021-11-09 DIAGNOSIS — M25.512 ACUTE PAIN OF LEFT SHOULDER: ICD-10-CM

## 2021-11-09 PROCEDURE — 97110 THERAPEUTIC EXERCISES: CPT

## 2021-11-09 NOTE — OP THERAPY DAILY TREATMENT
Outpatient Physical Therapy  DAILY TREATMENT     [facility] [place]  [address], [address]  [place] [address]  Phone:  [phone]  Fax:  [phone]    Date: 11/09/2021    Patient: Rosa Harris  YOB: 1980  MRN: [id number]     Time Calculation    Start time: 0730  Stop time: 0810 Time Calculation (min): 40 minutes         Chief Complaint: Left Shoulder pain  Visit #: 3    SUBJECTIVE:  No pain as of late and has started to slowly get into her basic exercise programing without pain.     OBJECTIVE:  Current objective measures: AROM WNL, no painful arc          Therapeutic Exercises (CPT 13545):     1. Supine foam roller series, angels, soldiers, HA Blue 10 each, pink alternating flexion    2. TRX row, x 20    3. Tband ER in forward flexion standing , red x 20, T band IR in flexion    4. Scap push ball circles, 2x 10 each ( 3 circles each)    5. Ball wall throw, 1min     6. ER is scaption suppported, 3# x 20    7. Standing band full scaption, x 12    8. Bodyblade, ER 1min    9. Scap table push, x 20      Therapeutic Exercise Summary: HEP instruction/performance and development. Handout provided and exercises located below:  Access Code: [email or web address]  URL: [email or web address]  Date: 11/02/2021  Prepared by: William Thrasher    Exercises        Standing Shoulder External Rotation with Resistance - 2 x daily - 2 sets - 15-20 reps      Scaption with Dumbbells - 2 x daily - 2 sets - 10-15 reps      Patient Education        Shoulder Impingement      Time-based treatments/modalities:    Physical Therapy Timed Treatment Charges  Therapeutic exercise minutes (CPT 25574): 40 minutes      Pain rating (1-10) before treatment:  0  Pain rating (1-10) after treatment:  0    ASSESSMENT:   Response to treatment: symptoms resolving and overall improving.  Previously consistent with subacromial pain syndrom/ tendonitis.  F/u in 2 weeks, trial independence with HEP.       PLAN/RECOMMENDATIONS:   Plan for treatment: therapy treatment to continue next visit.  Planned interventions for next visit: continue with current treatment.

## 2021-11-11 ENCOUNTER — APPOINTMENT (OUTPATIENT)
Dept: PHYSICAL THERAPY | Facility: REHABILITATION | Age: 41
End: 2021-11-11
Attending: INTERNAL MEDICINE
Payer: COMMERCIAL

## 2021-11-12 ENCOUNTER — OFFICE VISIT (OUTPATIENT)
Dept: MEDICAL GROUP | Facility: PHYSICIAN GROUP | Age: 41
End: 2021-11-12
Payer: COMMERCIAL

## 2021-11-12 VITALS
DIASTOLIC BLOOD PRESSURE: 78 MMHG | SYSTOLIC BLOOD PRESSURE: 112 MMHG | RESPIRATION RATE: 14 BRPM | HEIGHT: 64 IN | OXYGEN SATURATION: 96 % | WEIGHT: 180 LBS | HEART RATE: 60 BPM | BODY MASS INDEX: 30.73 KG/M2 | TEMPERATURE: 97.5 F

## 2021-11-12 DIAGNOSIS — N60.02 CYST OF LEFT BREAST: ICD-10-CM

## 2021-11-12 DIAGNOSIS — N64.4 BREAST PAIN: ICD-10-CM

## 2021-11-12 PROCEDURE — 99213 OFFICE O/P EST LOW 20 MIN: CPT | Performed by: INTERNAL MEDICINE

## 2021-11-12 RX ORDER — ZOLPIDEM TARTRATE 5 MG/1
5 TABLET ORAL NIGHTLY PRN
COMMUNITY
End: 2021-12-29 | Stop reason: SDUPTHER

## 2021-11-12 NOTE — PROGRESS NOTES
"Subjective:     CC: Left breast pain    HPI:   Rosa presents today to discuss the following issues:    The patient reports an increase in left breast pain over the last couple of days.  It is the side and location where she has a known breast mass.  She states it was initially painful and felt like the mass was growing, now the overlying skin is also itchy.  She has not had any nipple discharge.  She denies any known trauma to the breast.    Past Medical History:   Diagnosis Date   • Anemia 2/5/2009   • Disease due to severe acute respiratory syndrome coronavirus 2 (SARS-CoV-2) 11/29/2020   • Hx of total hysterectomy 1/27/2016   • Lipoma 11/27/2019    Formatting of this note might be different from the original. Added automatically from request for surgery [id number]   • Low back pain 2/13/2017       Social History     Tobacco Use   • Smoking status: Never Smoker   • Smokeless tobacco: Never Used   Vaping Use   • Vaping Use: Never used   Substance Use Topics   • Alcohol use: Not Currently   • Drug use: Never       Current Outpatient Medications Ordered in Epic   Medication Sig Dispense Refill   • zolpidem (AMBIEN) 5 MG Tab Take 5 mg by mouth at bedtime as needed for Sleep.     • levETIRAcetam (KEPPRA) 250 MG tablet Take 3 tablets daily. 270 Tablet 3   • ibuprofen (MOTRIN) 600 MG Tab Take 1 Tablet by mouth every 6 hours as needed. 45 Tablet 2     No current Epic-ordered facility-administered medications on file.       Allergies:  [place] oil    Health Maintenance: Completed    ROS:   Denies any recent fevers or chills. No nausea or vomiting. No chest pains or shortness of breath.      Objective:     Exam:  /78 (BP Location: Right arm, Patient Position: Sitting, BP Cuff Size: Adult)   Pulse 60   Temp 36.4 °C (97.5 °F) (Temporal)   Resp 14   Ht 1.626 m (5' 4\")   Wt 81.6 kg (180 lb)   SpO2 96%   BMI 30.90 kg/m²  Body mass index is 30.9 kg/m².    Gen: Alert and oriented, No apparent distress.  Breast: Breasts " examined seated and supine. No skin changes, peau d'orange or nipple retraction. No discharge. No axillary or supraclavicular adenopathy. No masses or nodularity palpable    Assessment & Plan:     41 y.o. female with the following -     Breast pain  Cyst of left breast  This is an acute condition.  The patient reports an increase in left breast pain over the last couple of days.  It is the side and location where she has a known breast mass.  She recently had a screening mammogram 8/30/2021 showed a partially circumscribed mass in the left retroareolar breast that was not definitively present on prior mammograms, it was recommended she get a diagnostic mammogram and ultrasound.Left diagnostic mammogram and ultrasound on 9/23/2021 showed a 2.9 cm retroareolar cyst in the left breast, as well as adjacent smaller simple cyst.  - US-SCREENING WHOLE BREAST UNILATERAL (3D SCREENING); Future    I spent a total of 20 minutes with record review, exam, communication with the patient, communication with other providers, and documentation of this encounter.    Return in about 8 months (around 7/12/2022) for Annual/Wellness Visit.    Please note that this dictation was created using voice recognition software. I have made every reasonable attempt to correct obvious errors, but I expect that there are errors of grammar and possibly content that I did not discover before finalizing the note.

## 2021-11-16 ENCOUNTER — APPOINTMENT (OUTPATIENT)
Dept: PHYSICAL THERAPY | Facility: REHABILITATION | Age: 41
End: 2021-11-16
Attending: INTERNAL MEDICINE
Payer: COMMERCIAL

## 2021-11-18 ENCOUNTER — APPOINTMENT (OUTPATIENT)
Dept: PHYSICAL THERAPY | Facility: REHABILITATION | Age: 41
End: 2021-11-18
Attending: INTERNAL MEDICINE
Payer: COMMERCIAL

## 2021-11-19 ENCOUNTER — PHARMACY VISIT (OUTPATIENT)
Dept: PHARMACY | Facility: MEDICAL CENTER | Age: 41
End: 2021-11-19
Payer: COMMERCIAL

## 2021-11-19 PROCEDURE — RXMED WILLOW AMBULATORY MEDICATION CHARGE: Performed by: INTERNAL MEDICINE

## 2021-11-19 RX ORDER — RNA INGREDIENT BNT-162B2 0.23 G/1.8ML
0.3 INJECTION, SUSPENSION INTRAMUSCULAR
Qty: 0.3 ML | Refills: 0 | Status: SHIPPED | OUTPATIENT
Start: 2021-11-19 | End: 2022-01-17

## 2021-12-08 ENCOUNTER — PHYSICAL THERAPY (OUTPATIENT)
Dept: PHYSICAL THERAPY | Facility: REHABILITATION | Age: 41
End: 2021-12-08
Attending: INTERNAL MEDICINE
Payer: COMMERCIAL

## 2021-12-08 DIAGNOSIS — M25.512 ACUTE PAIN OF LEFT SHOULDER: ICD-10-CM

## 2021-12-08 PROCEDURE — 97110 THERAPEUTIC EXERCISES: CPT

## 2021-12-29 DIAGNOSIS — F51.01 PRIMARY INSOMNIA: ICD-10-CM

## 2021-12-29 RX ORDER — ZOLPIDEM TARTRATE 5 MG/1
5 TABLET ORAL NIGHTLY PRN
Qty: 10 TABLET | Refills: 0 | Status: SHIPPED
Start: 2021-12-29 | End: 2022-01-07

## 2022-01-07 ENCOUNTER — OFFICE VISIT (OUTPATIENT)
Dept: MEDICAL GROUP | Facility: PHYSICIAN GROUP | Age: 42
End: 2022-01-07
Payer: COMMERCIAL

## 2022-01-07 VITALS
DIASTOLIC BLOOD PRESSURE: 74 MMHG | SYSTOLIC BLOOD PRESSURE: 122 MMHG | HEIGHT: 64 IN | RESPIRATION RATE: 14 BRPM | BODY MASS INDEX: 32.27 KG/M2 | HEART RATE: 63 BPM | OXYGEN SATURATION: 99 % | TEMPERATURE: 98.3 F | WEIGHT: 189 LBS

## 2022-01-07 DIAGNOSIS — F51.01 PRIMARY INSOMNIA: ICD-10-CM

## 2022-01-07 PROCEDURE — 99213 OFFICE O/P EST LOW 20 MIN: CPT | Performed by: INTERNAL MEDICINE

## 2022-01-07 RX ORDER — ZOLPIDEM TARTRATE 5 MG/1
5 TABLET ORAL NIGHTLY PRN
Qty: 30 TABLET | Refills: 2 | Status: SHIPPED | OUTPATIENT
Start: 2022-01-07 | End: 2022-02-06

## 2022-01-07 ASSESSMENT — PATIENT HEALTH QUESTIONNAIRE - PHQ9: CLINICAL INTERPRETATION OF PHQ2 SCORE: 0

## 2022-01-07 NOTE — PROGRESS NOTES
"Subjective:     CC:   Chief Complaint   Patient presents with   • Follow-Up     for the zolpidem         HPI:   Rosa presents today to discuss the following issues:    The patient is here for refill on her zolpidem.  No acute complaints.  She feels well.      Past Medical History:   Diagnosis Date   • Anemia 2/5/2009   • Disease due to severe acute respiratory syndrome coronavirus 2 (SARS-CoV-2) 11/29/2020   • Hx of total hysterectomy 1/27/2016   • Lipoma 11/27/2019    Formatting of this note might be different from the original. Added automatically from request for surgery [id number]   • Low back pain 2/13/2017       Social History     Tobacco Use   • Smoking status: Never Smoker   • Smokeless tobacco: Never Used   Vaping Use   • Vaping Use: Never used   Substance Use Topics   • Alcohol use: Not Currently   • Drug use: Never       Current Outpatient Medications Ordered in Epic   Medication Sig Dispense Refill   • zolpidem (AMBIEN) 5 MG Tab Take 1 Tablet by mouth at bedtime as needed for Sleep for up to 30 days. 30 Tablet 2   • levETIRAcetam (KEPPRA) 250 MG tablet Take 3 tablets daily. 270 Tablet 3   • ibuprofen (MOTRIN) 600 MG Tab Take 1 Tablet by mouth every 6 hours as needed. 45 Tablet 2   • COVID-19 mRNA Vaccine, Pfizer, (PFIZER-BIONTECH COVID-19 VACC) 30 MCG/0.3ML Suspension injection Inject 0.3 mL into the shoulder, thigh, or buttocks. 1st shot admin 11/19/2021 (Patient not taking: Reported on 1/7/2022) 0.3 mL 0     No current Epic-ordered facility-administered medications on file.       Allergies:  [place] oil    Health Maintenance: Completed    ROS:   Denies any recent fevers or chills. No nausea or vomiting. No chest pains or shortness of breath.      Objective:       Exam:  /74 (BP Location: Right arm, Patient Position: Sitting, BP Cuff Size: Adult)   Pulse 63   Temp 36.8 °C (98.3 °F) (Temporal)   Resp 14   Ht 1.626 m (5' 4\")   Wt 85.7 kg (189 lb)   SpO2 99%   Breastfeeding No   BMI 32.44 kg/m² "  Body mass index is 32.44 kg/m².    Gen: Alert and oriented, No apparent distress.  Lungs: Normal effort, CTA bilaterally, no wheezes, rhonchi, or rales  CV: Regular rate and rhythm. No murmurs, rubs, or gallops.  Ext: No clubbing, cyanosis, edema.        Assessment & Plan:     41 y.o. female with the following -     Primary insomnia  This is a chronic condition.  Well-controlled.  The patient has been on Ambien 5 to 10 mg for many years.  Review of the  shows that the patient was last given a prescription for zolpidem 5 mg, dispo #10, on 12/29/2021. Obtained and reviewed patient utilization report from [facility] pharmacy database on 1/7/2022 2:38 PM  prior to writing prescription for controlled substance II, III or IV per [place] bill . Based on assessment of the report, the prescription is medically necessary.   - Controlled Substance Treatment Agreement signed and scanned into the patient's chart on 7/1/2021  - zolpidem (AMBIEN) 5 MG Tab; Take 1 tablet by mouth at bedtime as needed for Sleep for up to 30 days.  Dispense: 30 tablet; Refill: 2      Return in about 3 months (around 4/7/2022) for Controlled Substance.    Please note that this dictation was created using voice recognition software. I have made every reasonable attempt to correct obvious errors, but I expect that there are errors of grammar and possibly content that I did not discover before finalizing the note.

## 2022-01-20 ENCOUNTER — TELEPHONE (OUTPATIENT)
Dept: PHYSICAL THERAPY | Facility: REHABILITATION | Age: 42
End: 2022-01-20

## 2022-01-20 NOTE — OP THERAPY DISCHARGE SUMMARY
Outpatient Physical Therapy  DISCHARGE SUMMARY NOTE      [facility] [place]  [address], [address]  [place] [address]  Phone:  [phone]  Fax:  [phone]    Date of Visit: 01/20/2022    Patient: Rosa Harris  YOB: 1980  MRN: [id number]     Referring Provider: Sabra Cuevas M.D.   Referring Diagnosis Acute pain of left shoulder [M25.512]       Your patient is being discharged from Physical Therapy with the following comments:   · Goals met    Comments:  Rosa Harris has completed 4 physical therapy sessions on her current prescription. She has improved function, decreased pain, improved strength, increased ROM, and she continues to progress with her home exercise program. Recommend to discharge patient to full independent home exercise program at this time. Thank you for the opportunity to assist you and your patient.         Limitations Remaining:  none    Recommendations:  F/u with PCP as needed    William Thrasher, PT, DPT    Date: 1/20/2022

## 2022-01-29 ENCOUNTER — OFFICE VISIT (OUTPATIENT)
Dept: URGENT CARE | Facility: PHYSICIAN GROUP | Age: 42
End: 2022-01-29
Payer: COMMERCIAL

## 2022-01-29 VITALS
TEMPERATURE: 96.8 F | BODY MASS INDEX: 31.58 KG/M2 | DIASTOLIC BLOOD PRESSURE: 74 MMHG | OXYGEN SATURATION: 97 % | RESPIRATION RATE: 20 BRPM | WEIGHT: 185 LBS | SYSTOLIC BLOOD PRESSURE: 124 MMHG | HEIGHT: 64 IN | HEART RATE: 76 BPM

## 2022-01-29 DIAGNOSIS — R05.9 COUGH: ICD-10-CM

## 2022-01-29 PROCEDURE — 99213 OFFICE O/P EST LOW 20 MIN: CPT | Performed by: FAMILY MEDICINE

## 2022-01-29 RX ORDER — CODEINE PHOSPHATE AND GUAIFENESIN 10; 100 MG/5ML; MG/5ML
5 SOLUTION ORAL EVERY 4 HOURS PRN
Qty: 120 ML | Refills: 0 | Status: SHIPPED | OUTPATIENT
Start: 2022-01-29 | End: 2022-02-05

## 2022-01-29 NOTE — PROGRESS NOTES
"CC:  cough        Cough  This is a new problem. The current episode started 2 days ago. The problem has been unchanged. The problem occurs constantly. The cough is dry. Associated symptoms include nasal congestion. Pertinent negatives include no fever, headaches, nausea, vomiting, diarrhea, sweats, weight loss or wheezing. Nothing aggravates the symptoms.  Patient has tried nothing for the symptoms. There is no history of asthma.          Current Outpatient Medications on File Prior to Visit   Medication Sig Dispense Refill   • zolpidem (AMBIEN) 5 MG Tab Take 1 Tablet by mouth at bedtime as needed for Sleep for up to 30 days. 30 Tablet 2   • levETIRAcetam (KEPPRA) 250 MG tablet Take 3 tablets daily. 270 Tablet 3   • ibuprofen (MOTRIN) 600 MG Tab Take 1 Tablet by mouth every 6 hours as needed. 45 Tablet 2     No current facility-administered medications on file prior to visit.         Social History     Tobacco Use   • Smoking status: Never Smoker   • Smokeless tobacco: Never Used   Vaping Use   • Vaping Use: Never used   Substance Use Topics   • Alcohol use: Not Currently   • Drug use: Never           Review of Systems   Constitutional: Negative for fever and weight loss.   HENT: negative for ear pain.    Cardiovascular - denies chest pain or dyspnea  Respiratory: Positive for cough.  .  Negative for wheezing.    Neurological: Negative for headaches.   GI - denies nausea, vomiting or diarrhea  Neuro - denies numbness or tingling.            Objective:     /74 (BP Location: Right arm, Patient Position: Sitting, BP Cuff Size: Adult)   Pulse 76   Temp 36 °C (96.8 °F) (Temporal)   Resp 20   Ht 1.626 m (5' 4\")   Wt 83.9 kg (185 lb)   SpO2 97%       Physical Exam   Constitutional: patient is oriented to person, place, and time. Patient appears well-developed and well-nourished. No distress.   HENT:   Head: Normocephalic and atraumatic.   Right Ear: External ear normal.   Left Ear: External ear normal.   Nose: " Mucosal edema  present. Right sinus exhibits no maxillary sinus tenderness. Left sinus exhibits no maxillary sinus tenderness.   Mouth/Throat: Mucous membranes are normal. No oral lesions.   No oropharyngeal exudate or posterior oropharyngeal edema.   Eyes: Conjunctivae and EOM are normal. Pupils are equal, round, and reactive to light. Right eye exhibits no discharge. Left eye exhibits no discharge. No scleral icterus.   Neck: Normal range of motion. Neck supple. No tracheal deviation present.   Cardiovascular: Normal rate, regular rhythm and normal heart sounds.  Exam reveals no friction rub.    Pulmonary/Chest: Effort normal. No respiratory distress. Patient has no wheezes or rhonchi. Patient has no rales.    Musculoskeletal:  exhibits no edema.   Lymphadenopathy:    no cervical LAD  Neurological: patient is alert and oriented to person, place, and time.   Skin: Skin is warm and dry. No rash noted. No erythema.   Psychiatric: patient  has a normal mood and affect.  behavior is normal.   Nursing note and vitals reviewed.              Assessment/Plan:        1. Cough   pt refused COVID screening.      - guaifenesin-codeine (CHERATUSSIN AC) Solution oral solution; Take 5 mL by mouth every four hours as needed for Cough for up to 7 days.  Dispense: 120 mL; Refill: 0    Follow up in one week if no improvement, sooner if symptoms worsen.

## 2022-02-04 PROBLEM — J06.9 UPPER RESPIRATORY TRACT INFECTION: Status: ACTIVE | Noted: 2022-02-04

## 2022-03-31 NOTE — PROGRESS NOTES
Virtual Visit: Established Patient   This visit was conducted via Zoom using secure and encrypted videoconferencing technology. The patient was in a private location in the state of [place].    The patient's identity was confirmed and verbal consent was obtained for this virtual visit.    Subjective:   CC:   Chief Complaint   Patient presents with   • Medication Follow-up       Rosa Harris is a 41 y.o. female presenting for evaluation and management of:    Patient is here for controlled substance medication refill.  No acute medical complaints.  She still has not had her labs done.    ROS   Denies any recent fevers or chills. No nausea or vomiting. No chest pains or shortness of breath.     Allergies   Allergen Reactions   • [place] Oil        Current medicines (including changes today)  Current Outpatient Medications   Medication Sig Dispense Refill   • [START ON 4/7/2022] zolpidem (AMBIEN) 5 MG Tab Take 1 Tablet by mouth at bedtime as needed for Sleep for up to 30 days. 30 Tablet 2   • levETIRAcetam (KEPPRA) 250 MG tablet Take 3 tablets daily. 270 Tablet 3   • ibuprofen (MOTRIN) 600 MG Tab Take 1 Tablet by mouth every 6 hours as needed. 45 Tablet 2     No current facility-administered medications for this visit.       Patient Active Problem List    Diagnosis Date Noted   • Encounter for long-term current use of high risk medication 04/01/2022   • Primary insomnia 07/01/2021   • Elevated fasting glucose 02/02/2016   • Partial epilepsy (HCC) 12/03/2015       Family History   Problem Relation Age of Onset   • No Known Problems Mother    • No Known Problems Father    • Cancer Paternal Grandmother        She  has a past medical history of Acute pain of left shoulder (9/24/2021), Anemia (2/5/2009), Disease due to severe acute respiratory syndrome coronavirus 2 (SARS-CoV-2) (11/29/2020), total hysterectomy (1/27/2016), Laceration of right lower extremity (8/17/2021), Lipoma (11/27/2019), Low back pain (2/13/2017), and  "Upper respiratory tract infection (2/4/2022).  She  has a past surgical history that includes abdominal hysterectomy total (2009) and lumpectomy (2001).       Objective:   Resp 20   Ht 1.626 m (5' 4\") Comment: per patient  Wt 83 kg (183 lb) Comment: per patient  Breastfeeding No   BMI 31.41 kg/m²     Physical Exam:  Constitutional: Alert, no distress, well-groomed.  Skin: No rashes in visible areas.  Eye: Round. Conjunctiva clear, lids normal. No icterus.   ENMT: Lips pink without lesions, good dentition, moist mucous membranes. Phonation normal.  Neck: No masses, no thyromegaly. Moves freely without pain.  Respiratory: Unlabored respiratory effort, no cough or audible wheeze  Psych: Alert and oriented x3, normal affect and mood.       Assessment and Plan:   The following treatment plan was discussed:     Primary insomnia  Encounter for long-term use of high-risk medication  This is a chronic medical condition.  Well-controlled.  The patient has been on Ambien 5 mg for many years.  Review of the  shows that the patient was last given a prescription for zolpidem 5 mg, dispo #30, on  3/7/2022. Obtained and reviewed patient utilization report from [facility] pharmacy database on 4/1/2022 4:10 AM  prior to writing prescription for controlled substance II, III or IV per [place] bill . Based on assessment of the report, the prescription is medically necessary.   - Controlled Substance Treatment Agreement signed and scanned into the patient's chart on 7/1/2021  - zolpidem (AMBIEN) 5 MG Tab; Take 1 Tablet by mouth at bedtime as needed for Sleep for up to 30 days.  Dispense: 30 Tablet; Refill: 2  - Pain Management Screen; Future    Partial epilepsy (HCC)  This is a chronic medical condition.  Stable.  The patient reports a single seizure in 2011 and has been on Keppra since that time.  We initially started a downward titration from 1000 mg twice daily to our current level of 750 mg (three 250 mg capsules) " daily.  -Continue Keppra 750 mg (three 250 mg capsules) daily    Obesity (BMI 30-39.9)  - Patient identified as having weight management issue.  Appropriate orders and counseling given.    Screening for deficiency anemia  - CBC WITH DIFFERENTIAL; Future    Follow-up: Return in about 3 months (around 7/1/2022) for Controlled Substance, f/u labs.     Please note that this dictation was created using voice recognition software. I have made every reasonable attempt to correct obvious errors, but I expect that there are errors of grammar and possibly content that I did not discover before finalizing the note.

## 2022-04-01 ENCOUNTER — TELEMEDICINE (OUTPATIENT)
Dept: MEDICAL GROUP | Facility: PHYSICIAN GROUP | Age: 42
End: 2022-04-01
Payer: COMMERCIAL

## 2022-04-01 VITALS — RESPIRATION RATE: 20 BRPM | HEIGHT: 64 IN | BODY MASS INDEX: 31.24 KG/M2 | WEIGHT: 183 LBS

## 2022-04-01 DIAGNOSIS — Z13.0 SCREENING FOR DEFICIENCY ANEMIA: ICD-10-CM

## 2022-04-01 DIAGNOSIS — F51.01 PRIMARY INSOMNIA: ICD-10-CM

## 2022-04-01 DIAGNOSIS — Z79.899 ENCOUNTER FOR LONG-TERM CURRENT USE OF HIGH RISK MEDICATION: ICD-10-CM

## 2022-04-01 DIAGNOSIS — E66.9 OBESITY (BMI 30-39.9): ICD-10-CM

## 2022-04-01 PROBLEM — S81.811A LACERATION OF RIGHT LOWER EXTREMITY: Status: RESOLVED | Noted: 2021-08-17 | Resolved: 2022-04-01

## 2022-04-01 PROBLEM — J06.9 UPPER RESPIRATORY TRACT INFECTION: Status: RESOLVED | Noted: 2022-02-04 | Resolved: 2022-04-01

## 2022-04-01 PROBLEM — M25.512 ACUTE PAIN OF LEFT SHOULDER: Status: RESOLVED | Noted: 2021-09-24 | Resolved: 2022-04-01

## 2022-04-01 PROCEDURE — 99214 OFFICE O/P EST MOD 30 MIN: CPT | Mod: 95 | Performed by: INTERNAL MEDICINE

## 2022-04-01 RX ORDER — ZOLPIDEM TARTRATE 5 MG/1
5 TABLET ORAL NIGHTLY PRN
Qty: 30 TABLET | Refills: 2 | Status: SHIPPED | OUTPATIENT
Start: 2022-04-07 | End: 2022-05-07

## 2022-04-01 RX ORDER — ZOLPIDEM TARTRATE 5 MG/1
TABLET ORAL
COMMUNITY
Start: 2022-03-07 | End: 2022-04-01 | Stop reason: SDUPTHER

## 2022-04-02 PROBLEM — E66.9 OBESITY (BMI 30-39.9): Status: ACTIVE | Noted: 2022-04-02

## 2022-07-02 ENCOUNTER — HOSPITAL ENCOUNTER (OUTPATIENT)
Dept: LAB | Facility: MEDICAL CENTER | Age: 42
End: 2022-07-02
Attending: INTERNAL MEDICINE
Payer: COMMERCIAL

## 2022-07-02 DIAGNOSIS — Z13.0 SCREENING FOR DEFICIENCY ANEMIA: ICD-10-CM

## 2022-07-02 DIAGNOSIS — F51.01 PRIMARY INSOMNIA: ICD-10-CM

## 2022-07-02 LAB
BASOPHILS # BLD AUTO: 0.4 % (ref 0–1.8)
BASOPHILS # BLD: 0.03 K/UL (ref 0–0.12)
EOSINOPHIL # BLD AUTO: 0.22 K/UL (ref 0–0.51)
EOSINOPHIL NFR BLD: 2.7 % (ref 0–6.9)
ERYTHROCYTE [DISTWIDTH] IN BLOOD BY AUTOMATED COUNT: 45.4 FL (ref 35.9–50)
HCT VFR BLD AUTO: 41.8 % (ref 37–47)
HGB BLD-MCNC: 13.5 G/DL (ref 12–16)
IMM GRANULOCYTES # BLD AUTO: 0.02 K/UL (ref 0–0.11)
IMM GRANULOCYTES NFR BLD AUTO: 0.2 % (ref 0–0.9)
LYMPHOCYTES # BLD AUTO: 2.28 K/UL (ref 1–4.8)
LYMPHOCYTES NFR BLD: 28.3 % (ref 22–41)
MCH RBC QN AUTO: 27.8 PG (ref 27–33)
MCHC RBC AUTO-ENTMCNC: 32.3 G/DL (ref 33.6–35)
MCV RBC AUTO: 86.2 FL (ref 81.4–97.8)
MONOCYTES # BLD AUTO: 0.48 K/UL (ref 0–0.85)
MONOCYTES NFR BLD AUTO: 5.9 % (ref 0–13.4)
NEUTROPHILS # BLD AUTO: 5.04 K/UL (ref 2–7.15)
NEUTROPHILS NFR BLD: 62.5 % (ref 44–72)
NRBC # BLD AUTO: 0 K/UL
NRBC BLD-RTO: 0 /100 WBC
PLATELET # BLD AUTO: 252 K/UL (ref 164–446)
PMV BLD AUTO: 10.5 FL (ref 9–12.9)
RBC # BLD AUTO: 4.85 M/UL (ref 4.2–5.4)
WBC # BLD AUTO: 8.1 K/UL (ref 4.8–10.8)

## 2022-07-02 PROCEDURE — G0481 DRUG TEST DEF 8-14 CLASSES: HCPCS

## 2022-07-02 PROCEDURE — 36415 COLL VENOUS BLD VENIPUNCTURE: CPT

## 2022-07-02 PROCEDURE — 85025 COMPLETE CBC W/AUTO DIFF WBC: CPT

## 2022-07-04 DIAGNOSIS — Z13.29 THYROID DISORDER SCREEN: ICD-10-CM

## 2022-07-04 DIAGNOSIS — Z13.6 SCREENING FOR CARDIOVASCULAR CONDITION: ICD-10-CM

## 2022-07-04 DIAGNOSIS — R73.01 ELEVATED FASTING GLUCOSE: ICD-10-CM

## 2022-07-04 NOTE — PROGRESS NOTES
Virtual Visit: Established Patient   This visit was conducted via Zoom using secure and encrypted videoconferencing technology. The patient was in a private location in the state of [place].    The patient's identity was confirmed and verbal consent was obtained for this virtual visit.    Subjective:   CC:   Chief Complaint   Patient presents with   • Medication Refill       Rosa Harris is a 41 y.o. female presenting for refill of her zolpidem.  She has no acute medical complaints.  She is still on the same dose of Keppra.  They did not draw all of her labs, orders have been placed and she will follow-up on those.    ROS   Denies any recent fevers or chills. No nausea or vomiting. No chest pains or shortness of breath.     Allergies   Allergen Reactions   • [place] Oil        Current medicines (including changes today)  Current Outpatient Medications   Medication Sig Dispense Refill   • zolpidem (AMBIEN) 5 MG Tab Take 1 Tablet by mouth at bedtime as needed for Sleep for up to 90 days. 30 Tablet 2   • levETIRAcetam (KEPPRA) 250 MG tablet Take 3 tablets daily. 270 Tablet 3   • ibuprofen (MOTRIN) 600 MG Tab Take 1 Tablet by mouth every 6 hours as needed. 45 Tablet 2     No current facility-administered medications for this visit.       Patient Active Problem List    Diagnosis Date Noted   • Obesity (BMI 30-39.9) 04/02/2022   • Encounter for long-term current use of high risk medication 04/01/2022   • Primary insomnia 07/01/2021   • Elevated fasting glucose 02/02/2016   • Partial epilepsy (HCC) 12/03/2015       Family History   Problem Relation Age of Onset   • No Known Problems Mother    • No Known Problems Father    • Cancer Paternal Grandmother        She  has a past medical history of Acute pain of left shoulder (9/24/2021), Anemia (2/5/2009), Disease due to severe acute respiratory syndrome coronavirus 2 (SARS-CoV-2) (11/29/2020), total hysterectomy (1/27/2016), Laceration of right lower extremity (8/17/2021),  "Lipoma (11/27/2019), Low back pain (2/13/2017), and Upper respiratory tract infection (2/4/2022).  She  has a past surgical history that includes abdominal hysterectomy total (2009) and lumpectomy (2001).       Objective:   Resp 18 Comment: per pt  Ht 1.626 m (5' 4\") Comment: per pt  Wt 81.6 kg (180 lb) Comment: per pt  BMI 30.90 kg/m²     Physical Exam:  Constitutional: Alert, no distress, well-groomed.  Skin: No rashes in visible areas.  Eye: Round. Conjunctiva clear, lids normal. No icterus.   ENMT: Lips pink without lesions, good dentition, moist mucous membranes. Phonation normal.  Neck: No masses, no thyromegaly. Moves freely without pain.  Respiratory: Unlabored respiratory effort, no cough or audible wheeze  Psych: Alert and oriented x3, normal affect and mood.       Assessment and Plan:   The following treatment plan was discussed:     Primary insomnia  Encounter for long-term use of high-risk medication  Chronic medical condition.    · Current medication is zolpidem 5 mg nightly.    · The patient reports good symptom control on this regimen.    · Review of the  shows that the patient was last given a prescription for zolpidem 5 mg, dispo #30, on   6/6/2022.   · Obtained and reviewed patient utilization report from [facility] pharmacy database on 7/5/2022 5:03 AM  prior to writing prescription for controlled substance II, III or IV per [place] bill . Based on assessment of the report, the prescription is medically necessary.   · Controlled Substance Treatment Agreement signed and scanned into the patient's chart on 7/1/2021  · Urine drug screen completed 7/2/2022  - zolpidem (AMBIEN) 5 MG Tab; Take 1 Tablet by mouth at bedtime as needed for Sleep for up to 30 days.  Dispense: 30 Tablet; Refill: 2    Partial epilepsy (HCC)  Chronic medical condition.    · Patient had a single tonic-clonic seizure in 2011 with no recurrent seizures.  · The patient was started on Keppra 1000 mg twice daily in 2011, " we have been attempting to titrate her off of the medication.    · Current medication is Keppra 750 mg (three 250 mg capsules) daily.      Follow-up: Return in about 3 months (around 10/5/2022) for Controlled Substance.     Please note that this dictation was created using voice recognition software. I have made every reasonable attempt to correct obvious errors, but I expect that there are errors of grammar and possibly content that I did not discover before finalizing the note.

## 2022-07-05 ENCOUNTER — TELEMEDICINE (OUTPATIENT)
Dept: MEDICAL GROUP | Facility: PHYSICIAN GROUP | Age: 42
End: 2022-07-05
Payer: COMMERCIAL

## 2022-07-05 VITALS — HEIGHT: 64 IN | WEIGHT: 180 LBS | BODY MASS INDEX: 30.73 KG/M2 | RESPIRATION RATE: 18 BRPM

## 2022-07-05 DIAGNOSIS — G40.109 PARTIAL EPILEPSY (HCC): ICD-10-CM

## 2022-07-05 DIAGNOSIS — Z79.899 ENCOUNTER FOR LONG-TERM CURRENT USE OF HIGH RISK MEDICATION: ICD-10-CM

## 2022-07-05 DIAGNOSIS — F51.01 PRIMARY INSOMNIA: ICD-10-CM

## 2022-07-05 PROCEDURE — 99214 OFFICE O/P EST MOD 30 MIN: CPT | Mod: 95 | Performed by: INTERNAL MEDICINE

## 2022-07-05 RX ORDER — ZOLPIDEM TARTRATE 5 MG/1
5 TABLET ORAL NIGHTLY PRN
Qty: 30 TABLET | Refills: 2 | Status: SHIPPED | OUTPATIENT
Start: 2022-07-05 | End: 2022-10-03

## 2022-07-05 RX ORDER — ZOLPIDEM TARTRATE 5 MG/1
TABLET ORAL
COMMUNITY
Start: 2022-06-06 | End: 2022-07-05

## 2022-07-08 LAB
1OH-MIDAZOLAM UR QL SCN: NOT DETECTED
6MAM UR QL: NOT DETECTED
7AMINOCLONAZEPAM UR QL: NOT DETECTED
A-OH ALPRAZ UR QL: NOT DETECTED
ALPRAZ UR QL: NOT DETECTED
AMPHET UR QL SCN: NOT DETECTED
ANNOTATION COMMENT IMP: NORMAL
ANNOTATION COMMENT IMP: NORMAL
BARBITURATES UR QL: NOT DETECTED
BUPRENORPHINE UR QL: NOT DETECTED
BZE UR QL: NOT DETECTED
CARBOXYTHC UR QL: NOT DETECTED
CARISOPRODOL UR QL: NOT DETECTED
CLONAZEPAM UR QL: NOT DETECTED
CODEINE UR QL: NOT DETECTED
DIAZEPAM UR QL: NOT DETECTED
ETHYL GLUCURONIDE UR QL: PRESENT
FENTANYL UR QL: NOT DETECTED
GABAPENTIN UR QL: NOT DETECTED
HYDROCODONE UR QL: NOT DETECTED
HYDROMORPHONE UR QL: NOT DETECTED
LORAZEPAM UR QL: NOT DETECTED
MDA UR QL: NOT DETECTED
MDEA UR QL: NOT DETECTED
MDMA UR QL: NOT DETECTED
MEPERIDINE UR QL: NOT DETECTED
METHADONE UR QL: NOT DETECTED
METHAMPHET UR QL: NOT DETECTED
MIDAZOLAM UR QL SCN: NOT DETECTED
MORPHINE UR QL: NOT DETECTED
NALOXONE UR QL SCN: NOT DETECTED
NORBUPRENORPHINE UR QL CFM: NOT DETECTED
NORDIAZEPAM UR QL: NOT DETECTED
NORFENTANYL UR QL: NOT DETECTED
NORHYDROCODONE UR QL CFM: NOT DETECTED
NOROXYCODONE UR QL CFM: NOT DETECTED
NOROXYMORPH CO100 Q0458: NOT DETECTED
OXAZEPAM UR QL: NOT DETECTED
OXYCODONE UR QL: NOT DETECTED
OXYMORPHONE UR QL: NOT DETECTED
PATHOLOGY STUDY: NORMAL
PCP UR QL: NOT DETECTED
PHENTERMINE UR QL: NOT DETECTED
PPAA UR QL: NOT DETECTED
PREGABALIN UR QL SCN: NOT DETECTED
SERVICE CMNT-IMP: NORMAL
TAPENADOL OSULF CO200 Q0473: NOT DETECTED
TAPENTADOL UR QL SCN: NOT DETECTED
TEMAZEPAM UR QL: NOT DETECTED
TRAMADOL UR QL: NOT DETECTED
ZOLPIDEM PHENYL-4-CARB UR QL SCN: PRESENT
ZOLPIDEM UR QL: NOT DETECTED

## 2022-09-27 DIAGNOSIS — G40.109 PARTIAL EPILEPSY (HCC): ICD-10-CM

## 2022-09-27 RX ORDER — LEVETIRACETAM 250 MG/1
TABLET ORAL
Qty: 270 TABLET | Refills: 3 | Status: SHIPPED | OUTPATIENT
Start: 2022-09-27

## 2022-09-29 NOTE — PROGRESS NOTES
Subjective:     CC:   Chief Complaint   Patient presents with    Medication Refill    Influenza    Follow-Up         HPI:   Rosa presents today for follow-up visit and to discuss the following issues:    Primary insomnia  The patient is here for refill of her medication.  She continues to report that the medication for her with no significant side effects.    Post-viral cough syndrome  The patient recently had an upper respiratory infection and now has a persistent cough.  No associated fevers, chest pain, shortness of breath.  She is wondering if there is medication that she could be given for this.      Past Medical History:   Diagnosis Date    Acute pain of left shoulder 9/24/2021    Anemia 2/5/2009    Disease due to severe acute respiratory syndrome coronavirus 2 (SARS-CoV-2) 11/29/2020    Hx of total hysterectomy 1/27/2016    Laceration of right lower extremity 8/17/2021    Lipoma 11/27/2019    Formatting of this note might be different from the original. Added automatically from request for surgery [id number]    Low back pain 2/13/2017    Upper respiratory tract infection 2/4/2022       Social History     Tobacco Use    Smoking status: Never    Smokeless tobacco: Never   Vaping Use    Vaping Use: Never used   Substance Use Topics    Alcohol use: Yes     Comment: Socially     Drug use: Never       Current Outpatient Medications Ordered in Epic   Medication Sig Dispense Refill    zolpidem (AMBIEN) 5 MG Tab Take 1 Tablet by mouth at bedtime as needed for Sleep for up to 30 days. 30 Tablet 2    albuterol 108 (90 Base) MCG/ACT Aero Soln inhalation aerosol Inhale 2 Puffs every four hours as needed for Shortness of Breath. 1 Each 2    benzonatate (TESSALON) 100 MG Cap Take 1 Capsule by mouth 3 times a day as needed for Cough. 60 Capsule 0    levETIRAcetam (KEPPRA) 250 MG tablet Take 3 tablets daily. 270 Tablet 3    ibuprofen (MOTRIN) 600 MG Tab Take 1 Tablet by mouth every 6 hours as needed. 45 Tablet 2     No current  "Epic-ordered facility-administered medications on file.       Allergies:  [place] oil    Health Maintenance: Completed    Review of Systems:   Denies any recent fevers or chills. No nausea or vomiting. No chest pains or shortness of breath.      Objective:       Exam:  /62 (BP Location: Right arm, Patient Position: Sitting, BP Cuff Size: Adult)   Pulse 95   Temp 37 °C (98.6 °F) (Temporal)   Ht 1.626 m (5' 4\")   Wt 81.4 kg (179 lb 6.4 oz)   SpO2 (!) 58%   BMI 30.79 kg/m²  Body mass index is 30.79 kg/m².    Gen: Alert and oriented, No apparent distress.  Lungs: Normal effort, CTA bilaterally, no wheezes, rhonchi, or rales  CV: Regular rate and rhythm. No murmurs, rubs, or gallops.  Ext: No clubbing, cyanosis, edema.        Assessment & Plan:     42 y.o. female with the following -     Primary insomnia  Chronic medical condition.    Current medication is zolpidem 5 mg nightly.    The patient reports good symptom control on this regimen.    Review of the  shows that the patient was last given a prescription for zolpidem 5 mg, dispo #30, on 9/6/2022.   Obtained and reviewed patient utilization report from [facility] pharmacy database on 9/292022 5:03 AM  prior to writing prescription for controlled substance II, III or IV per [place] bill . Based on assessment of the report, the prescription is medically necessary.   Controlled Substance Treatment Agreement signed and scanned into the patient's chart on 7/1/2021  Urine drug screen completed 7/2/2022  - zolpidem (AMBIEN) 5 MG Tab; Take 1 Tablet by mouth at bedtime as needed for Sleep for up to 30 days.  Dispense: 30 Tablet; Refill: 2    Post-viral cough syndrome  Acute medical condition.  The patient recently had an upper respiratory infection and now has a persistent cough.  She is wondering if there is medication that she could be given for this.  - albuterol 108 (90 Base) MCG/ACT Aero Soln inhalation aerosol; Inhale 2 Puffs every four hours as " needed for Shortness of Breath.  Dispense: 1 Each; Refill: 2  - benzonatate (TESSALON) 100 MG Cap; Take 1 Capsule by mouth 3 times a day as needed for Cough.  Dispense: 60 Capsule; Refill: 0      Return in about 3 months (around 12/30/2022) for Controlled Substance.    Please note that this dictation was created using voice recognition software. I have made every reasonable attempt to correct obvious errors, but I expect that there are errors of grammar and possibly content that I did not discover before finalizing the note.

## 2022-09-30 ENCOUNTER — OFFICE VISIT (OUTPATIENT)
Dept: MEDICAL GROUP | Facility: PHYSICIAN GROUP | Age: 42
End: 2022-09-30
Payer: COMMERCIAL

## 2022-09-30 VITALS
OXYGEN SATURATION: 58 % | HEART RATE: 95 BPM | DIASTOLIC BLOOD PRESSURE: 62 MMHG | WEIGHT: 179.4 LBS | BODY MASS INDEX: 30.63 KG/M2 | HEIGHT: 64 IN | TEMPERATURE: 98.6 F | SYSTOLIC BLOOD PRESSURE: 108 MMHG

## 2022-09-30 DIAGNOSIS — F51.01 PRIMARY INSOMNIA: ICD-10-CM

## 2022-09-30 DIAGNOSIS — R05.8 POST-VIRAL COUGH SYNDROME: ICD-10-CM

## 2022-09-30 PROCEDURE — 99213 OFFICE O/P EST LOW 20 MIN: CPT | Performed by: INTERNAL MEDICINE

## 2022-09-30 RX ORDER — BENZONATATE 100 MG/1
100 CAPSULE ORAL 3 TIMES DAILY PRN
Qty: 60 CAPSULE | Refills: 0 | Status: SHIPPED
Start: 2022-09-30 | End: 2023-03-18

## 2022-09-30 RX ORDER — ZOLPIDEM TARTRATE 5 MG/1
5 TABLET ORAL NIGHTLY PRN
Qty: 30 TABLET | Refills: 2 | Status: SHIPPED | OUTPATIENT
Start: 2022-09-30 | End: 2022-10-30

## 2022-09-30 RX ORDER — ALBUTEROL SULFATE 90 UG/1
2 AEROSOL, METERED RESPIRATORY (INHALATION) EVERY 4 HOURS PRN
Qty: 1 EACH | Refills: 2 | Status: SHIPPED | OUTPATIENT
Start: 2022-09-30

## 2023-01-04 ENCOUNTER — TELEMEDICINE (OUTPATIENT)
Dept: MEDICAL GROUP | Facility: PHYSICIAN GROUP | Age: 43
End: 2023-01-04
Payer: COMMERCIAL

## 2023-01-04 VITALS — BODY MASS INDEX: 30.39 KG/M2 | HEIGHT: 64 IN | WEIGHT: 178 LBS

## 2023-01-04 DIAGNOSIS — G40.109 PARTIAL EPILEPSY (HCC): ICD-10-CM

## 2023-01-04 DIAGNOSIS — F51.01 PRIMARY INSOMNIA: ICD-10-CM

## 2023-01-04 DIAGNOSIS — Z79.899 ENCOUNTER FOR LONG-TERM CURRENT USE OF HIGH RISK MEDICATION: ICD-10-CM

## 2023-01-04 DIAGNOSIS — R05.1 ACUTE COUGH: ICD-10-CM

## 2023-01-04 PROCEDURE — 99214 OFFICE O/P EST MOD 30 MIN: CPT | Mod: 95 | Performed by: INTERNAL MEDICINE

## 2023-01-04 RX ORDER — AMOXICILLIN 875 MG/1
875 TABLET, COATED ORAL 2 TIMES DAILY
Qty: 14 TABLET | Refills: 0 | Status: SHIPPED | OUTPATIENT
Start: 2023-01-04 | End: 2023-01-11

## 2023-01-04 RX ORDER — ZOLPIDEM TARTRATE 5 MG/1
TABLET ORAL
COMMUNITY
Start: 2022-12-05 | End: 2023-03-18

## 2023-01-04 RX ORDER — CODEINE PHOSPHATE AND GUAIFENESIN 10; 100 MG/5ML; MG/5ML
5 SOLUTION ORAL EVERY 4 HOURS PRN
Qty: 180 ML | Refills: 0 | Status: SHIPPED | OUTPATIENT
Start: 2023-01-04 | End: 2023-01-18

## 2023-01-04 RX ORDER — ZOLPIDEM TARTRATE 5 MG/1
5 TABLET ORAL NIGHTLY PRN
Qty: 30 TABLET | Refills: 2 | Status: SHIPPED | OUTPATIENT
Start: 2023-01-04 | End: 2023-04-04 | Stop reason: SDUPTHER

## 2023-01-04 NOTE — PROGRESS NOTES
Virtual Visit: Established Patient   This visit was conducted via Zoom using secure and encrypted videoconferencing technology. The patient was in a private location in the state of [place].    The patient's identity was confirmed and verbal consent was obtained for this virtual visit.    Subjective:   CC:   Chief Complaint   Patient presents with    Medication Management     Ambian     Cough     X 2 days       Rosa Harris is a 42 y.o. female presenting for evaluation and management of:    Acute cough  The patient reports a 3-day history of mildly productive cough and sore throat.  She denies other symptoms such as fevers, headache, fatigue, malaise, muscle aches, abdominal pain, nausea or vomiting, diarrhea.  Her son recently had similar symptoms.  She has been taking DayQuil with minimal improvement.    Primary insomnia  Encounter for long-term use of high-risk medication  The patient continues to report good symptom control on zolpidem 5 mg nightly.  She is due for a refill.  There has been no evidence of dose escalation.      ROS   Denies any recent fevers or chills. No nausea or vomiting. No chest pains or shortness of breath.     Allergies   Allergen Reactions    [place] Oil        Current medicines (including changes today)  Current Outpatient Medications   Medication Sig Dispense Refill    zolpidem (AMBIEN) 5 MG Tab       zolpidem (AMBIEN) 5 MG Tab Take 1 Tablet by mouth at bedtime as needed for Sleep for up to 90 days. 30 Tablet 2    ibuprofen (MOTRIN) 600 MG Tab Take 1 Tablet by mouth every 6 hours as needed for Moderate Pain. 90 Tablet 3    albuterol 108 (90 Base) MCG/ACT Aero Soln inhalation aerosol Inhale 2 Puffs every four hours as needed for Shortness of Breath. 1 Each 2    levETIRAcetam (KEPPRA) 250 MG tablet Take 3 tablets daily. 270 Tablet 3    benzonatate (TESSALON) 100 MG Cap Take 1 Capsule by mouth 3 times a day as needed for Cough. (Patient not taking: Reported on 1/4/2023) 60 Capsule 0  "    No current facility-administered medications for this visit.       Patient Active Problem List    Diagnosis Date Noted    Obesity (BMI 30-39.9) 04/02/2022    Encounter for long-term current use of high risk medication 04/01/2022    Primary insomnia 07/01/2021    Elevated fasting glucose 02/02/2016    Partial epilepsy (HCC) 12/03/2015       Family History   Problem Relation Age of Onset    No Known Problems Mother     No Known Problems Father     Cancer Paternal Grandmother        She  has a past medical history of Acute pain of left shoulder (9/24/2021), Anemia (2/5/2009), Disease due to severe acute respiratory syndrome coronavirus 2 (SARS-CoV-2) (11/29/2020), total hysterectomy (1/27/2016), Laceration of right lower extremity (8/17/2021), Lipoma (11/27/2019), Low back pain (2/13/2017), and Upper respiratory tract infection (2/4/2022).  She  has a past surgical history that includes abdominal hysterectomy total (2009) and lumpectomy (2001).       Objective:   Ht 1.613 m (5' 3.5\") Comment: per pt  Wt 80.7 kg (178 lb) Comment: per pt  BMI 31.04 kg/m²     Physical Exam:  Constitutional: Alert, no distress, well-groomed.  Skin: No rashes in visible areas.  Eye: Round. Conjunctiva clear, lids normal. No icterus.   ENMT: Lips pink without lesions, good dentition, moist mucous membranes. Phonation normal.  Neck: No masses, no thyromegaly. Moves freely without pain.  Respiratory: Unlabored respiratory effort, no cough or audible wheeze  Psych: Alert and oriented x3, normal affect and mood.       Assessment and Plan:   The following treatment plan was discussed:     Acute cough  Acute medical condition.  The patient reports a 3-day history of mildly productive cough and sore throat.  She denies other symptoms such as fevers, headache, fatigue, malaise, muscle aches, abdominal pain, nausea or vomiting, diarrhea.  Her son recently had similar symptoms.  She has been taking DayQuil with minimal improvement.  -Will " initiate treatment with a 7-day course of amoxicillin and Robitussin AC as needed for cough  - amoxicillin (AMOXIL) 875 MG tablet; Take 1 Tablet by mouth 2 times a day for 7 days.  Dispense: 14 Tablet; Refill: 0  - guaifenesin-codeine (ROBITUSSIN AC) Solution oral solution; Take 5 mL by mouth every four hours as needed for Cough for up to 14 days.  Dispense: 180 mL; Refill: 0    Primary insomnia  Encounter for long-term use of high-risk medication  Chronic medical condition.  Ongoing and well controlled. Current medication is zolpidem 5 mg nightly.  The patient reports good symptom control on this regimen.  Review of the  shows that the patient was last given a prescription for zolpidem 5 mg, dispo #30, on 12/5/2022. Obtained and reviewed patient utilization report from [facility] pharmacy database on 1/4/2023 10:43 AM  prior to writing prescription for controlled substance II, III or IV per [place] bill . Based on assessment of the report, the prescription is medically necessary.   -Continue zolpidem 5 mg nightly as needed for sleep  -Controlled Substance Treatment Agreement signed and scanned into the patient's chart on 7/1/2021  -Urine drug screen completed 7/2/2022  - zolpidem (AMBIEN) 5 MG Tab; Take 1 Tablet by mouth at bedtime as needed for Sleep for up to 90 days.  Dispense: 30 Tablet; Refill: 2     Partial epilepsy (HCC)  Chronic medical condition.  Ongoing and well controlled.  The patient had a single tonic-clonic seizure in 2011 and has had no recurrent seizures since that time.  Current medication is Keppra 750 mg (three 250 mg capsules) daily.  -Continue Keppra 750 mg (three 250 mg capsules) daily    Follow-up: Return in about 3 months (around 4/4/2023) for Controlled Substance.    Please note that this dictation was created using voice recognition software. I have made every reasonable attempt to correct obvious errors, but I expect that there are errors of grammar and possibly content that I  did not discover before finalizing the note.

## 2023-03-16 NOTE — PROGRESS NOTES
Subjective:     CC:   Chief Complaint   Patient presents with    Referral Needed     Demonology and possibly more     Other     Right eye is swollen have been to eye dr. / Both knees soreness          HPI:   Rosa presents today for follow-up visit and to discuss the following issues:    Acute pain of right knee  Patellar bursitis of right knee  The patient reports left lateral knee pain intermittently x2 months.  No associated swelling or erythema.  She has not noticed any clicking of the knee.  She reports right knee pain just beneath the patella.  There is no associated redness or swelling.  She has not noticed any clicking of the knee.  The pain only occurs when she is on her knees for dancing routines.  She has purchased knee brace cushions but this has not helped significantly.  She has not taken anti-inflammatories consistently for these conditions as she does not like to take medications.      Acute conjunctivitis of right eye, unspecified acute conjunctivitis type  The patient reports right upper and lower eyelid redness and swelling for which she has been prescribed erythromycin antibiotic ointment.  Her symptoms are improving, but she has developed some swelling of her upper eyelid.  She denies blurry vision or pain with extraocular eye movements.  She would like for her eye to be checked.      Past Medical History:   Diagnosis Date    Acute pain of left shoulder 9/24/2021    Anemia 2/5/2009    Disease due to severe acute respiratory syndrome coronavirus 2 (SARS-CoV-2) 11/29/2020    Hx of total hysterectomy 1/27/2016    Laceration of right lower extremity 8/17/2021    Lipoma 11/27/2019    Formatting of this note might be different from the original. Added automatically from request for surgery [id number]    Low back pain 2/13/2017    Upper respiratory tract infection 2/4/2022       Social History     Tobacco Use    Smoking status: Never    Smokeless tobacco: Never   Vaping Use    Vaping Use: Never used  "  Substance Use Topics    Alcohol use: Yes     Comment: Socially     Drug use: Never       Current Outpatient Medications Ordered in Epic   Medication Sig Dispense Refill    erythromycin 5 MG/GM Ointment       zolpidem (AMBIEN) 5 MG Tab Take 1 Tablet by mouth at bedtime as needed for Sleep for up to 90 days. 30 Tablet 2    ibuprofen (MOTRIN) 600 MG Tab Take 1 Tablet by mouth every 6 hours as needed for Moderate Pain. 90 Tablet 3    albuterol 108 (90 Base) MCG/ACT Aero Soln inhalation aerosol Inhale 2 Puffs every four hours as needed for Shortness of Breath. 1 Each 2    zolpidem (AMBIEN) 5 MG Tab  (Patient not taking: Reported on 3/17/2023)      benzonatate (TESSALON) 100 MG Cap Take 1 Capsule by mouth 3 times a day as needed for Cough. (Patient not taking: Reported on 1/4/2023) 60 Capsule 0    levETIRAcetam (KEPPRA) 250 MG tablet Take 3 tablets daily. 270 Tablet 3     No current Epic-ordered facility-administered medications on file.       Allergies:  [place] oil    Health Maintenance: Completed    Review of Systems:  No fevers or chills. No cough, chest pain, or shortness of breath.       Objective:       Exam:  /80 (BP Location: Right arm, Patient Position: Sitting, BP Cuff Size: Adult)   Pulse (!) 54   Temp 36.5 °C (97.7 °F) (Temporal)   Ht 1.626 m (5' 4\")   Wt 78.7 kg (173 lb 9.6 oz)   SpO2 99%   BMI 29.80 kg/m²  Body mass index is 29.8 kg/m².    Gen: Alert and oriented, No apparent distress.  Eyes: Resolving conjunctivitis and upper eyelid hordeolum  Lungs: Normal effort, CTA bilaterally, no wheezes, rhonchi, or rales  CV: Regular rate and rhythm. No murmurs, rubs, or gallops.  Knees: No swelling or erythema, full range of motion, right knee with infrapatellar tenderness, left knee with lateral meniscal tenderness, no clicking noted        Assessment & Plan:     42 y.o. female with the following -     Acute pain of right knee  Patellar bursitis of right knee  Acute medical conditions.  The patient " reports left lateral knee pain intermittently x2 months.  No associated swelling or erythema.  She reports right knee pain just beneath the patella.  There is no associated redness or swelling.  The pain only occurs when she is on her knees for dancing routines.  She has purchased knee brace cushions but this has not helped significantly.  She has not taken anti-inflammatories consistently for these conditions as she does not like to take medications.  -Patient likely with bursitis of the right knee, recommended bracing, icing and NSAIDs as well as referral to orthopedics  -For the patient's left knee pain, will obtain x-ray and refer to orthopedics as she likely has lateral meniscal or ligament injury   - DX-KNEE 3 VIEWS RIGHT; Future  - Referral to Orthopedics    Acute conjunctivitis of right eye, unspecified acute conjunctivitis type  Acute medical condition.  The patient reports right upper and lower eyelid redness and swelling for which she has been prescribed erythromycin antibiotic ointment.  Her symptoms are improving, but she has developed some swelling of her upper eyelid.  She denies blurry vision or pain with extraocular eye movements.  She would like for her eye to be checked.  Exam consistent with resolving conjunctivitis and hordeolum.  -Advised patient to continue topical erythromycin ointment until course completion as well as moist warm compresses and cleansing with no tear baby shampoo    Skin cancer screening  - Referral to Dermatology      Return if symptoms worsen or fail to improve.    Please note that this dictation was created using voice recognition software. I have made every reasonable attempt to correct obvious errors, but I expect that there are errors of grammar and possibly content that I did not discover before finalizing the note.

## 2023-03-17 ENCOUNTER — OFFICE VISIT (OUTPATIENT)
Dept: MEDICAL GROUP | Facility: PHYSICIAN GROUP | Age: 43
End: 2023-03-17
Payer: COMMERCIAL

## 2023-03-17 VITALS
OXYGEN SATURATION: 99 % | SYSTOLIC BLOOD PRESSURE: 112 MMHG | HEART RATE: 54 BPM | WEIGHT: 173.6 LBS | DIASTOLIC BLOOD PRESSURE: 80 MMHG | BODY MASS INDEX: 29.64 KG/M2 | HEIGHT: 64 IN | TEMPERATURE: 97.7 F

## 2023-03-17 DIAGNOSIS — Z12.83 SKIN CANCER SCREENING: ICD-10-CM

## 2023-03-17 DIAGNOSIS — M25.561 ACUTE PAIN OF RIGHT KNEE: ICD-10-CM

## 2023-03-17 DIAGNOSIS — M70.51 PATELLAR BURSITIS OF RIGHT KNEE: ICD-10-CM

## 2023-03-17 DIAGNOSIS — H10.31 ACUTE CONJUNCTIVITIS OF RIGHT EYE, UNSPECIFIED ACUTE CONJUNCTIVITIS TYPE: ICD-10-CM

## 2023-03-17 PROCEDURE — 99213 OFFICE O/P EST LOW 20 MIN: CPT | Performed by: INTERNAL MEDICINE

## 2023-03-17 RX ORDER — ERYTHROMYCIN 5 MG/G
OINTMENT OPHTHALMIC
COMMUNITY
Start: 2023-03-08

## 2023-03-17 ASSESSMENT — PATIENT HEALTH QUESTIONNAIRE - PHQ9: CLINICAL INTERPRETATION OF PHQ2 SCORE: 0

## 2023-03-23 ENCOUNTER — APPOINTMENT (OUTPATIENT)
Dept: RADIOLOGY | Facility: MEDICAL CENTER | Age: 43
End: 2023-03-23
Attending: INTERNAL MEDICINE
Payer: COMMERCIAL

## 2023-04-03 ENCOUNTER — PHARMACY VISIT (OUTPATIENT)
Dept: PHARMACY | Facility: MEDICAL CENTER | Age: 43
End: 2023-04-03
Payer: COMMERCIAL

## 2023-04-03 PROCEDURE — RXMED WILLOW AMBULATORY MEDICATION CHARGE: Performed by: INTERNAL MEDICINE

## 2023-04-04 DIAGNOSIS — F51.01 PRIMARY INSOMNIA: ICD-10-CM

## 2023-04-05 RX ORDER — ZOLPIDEM TARTRATE 5 MG/1
5 TABLET ORAL NIGHTLY PRN
Qty: 30 TABLET | Refills: 2 | Status: SHIPPED | OUTPATIENT
Start: 2023-04-05 | End: 2023-07-04

## 2025-07-23 ENCOUNTER — EH NON-PROVIDER (OUTPATIENT)
Dept: OCCUPATIONAL MEDICINE | Facility: CLINIC | Age: 45
End: 2025-07-23

## 2025-07-23 ENCOUNTER — HOSPITAL ENCOUNTER (OUTPATIENT)
Facility: MEDICAL CENTER | Age: 45
End: 2025-07-23
Attending: PREVENTIVE MEDICINE
Payer: COMMERCIAL

## 2025-07-23 DIAGNOSIS — Z02.89 ENCOUNTER FOR OCCUPATIONAL HEALTH ASSESSMENT: Primary | ICD-10-CM

## 2025-07-23 DIAGNOSIS — Z02.89 ENCOUNTER FOR OCCUPATIONAL HEALTH ASSESSMENT: ICD-10-CM

## 2025-07-23 DIAGNOSIS — Z02.1 PRE-EMPLOYMENT DRUG SCREENING: ICD-10-CM

## 2025-07-23 LAB
AMP AMPHETAMINE: NORMAL
BAR BARBITURATES: NORMAL
BZO BENZODIAZEPINES: NORMAL
COC COCAINE: NORMAL
INT CON NEG: NORMAL
INT CON POS: NORMAL
MDMA ECSTASY: NORMAL
MET METHAMPHETAMINES: NORMAL
MTD METHADONE: NORMAL
OPI OPIATES: NORMAL
OXY OXYCODONE: NORMAL
PCP PHENCYCLIDINE: NORMAL
POC URINE DRUG SCREEN OCDRS: NEGATIVE
THC: NORMAL

## 2025-07-23 PROCEDURE — 80305 DRUG TEST PRSMV DIR OPT OBS: CPT | Performed by: PREVENTIVE MEDICINE

## 2025-07-23 PROCEDURE — 86480 TB TEST CELL IMMUN MEASURE: CPT | Performed by: PREVENTIVE MEDICINE

## 2025-07-24 LAB
GAMMA INTERFERON BACKGROUND BLD IA-ACNC: 0.02 IU/ML
M TB IFN-G BLD-IMP: NEGATIVE
M TB IFN-G CD4+ BCKGRND COR BLD-ACNC: 0 IU/ML
MITOGEN IGNF BCKGRD COR BLD-ACNC: >10 IU/ML
QFT TB2 - NIL TBQ2: 0 IU/ML